# Patient Record
Sex: FEMALE | Race: WHITE | Employment: OTHER | URBAN - METROPOLITAN AREA
[De-identification: names, ages, dates, MRNs, and addresses within clinical notes are randomized per-mention and may not be internally consistent; named-entity substitution may affect disease eponyms.]

---

## 2022-02-02 ENCOUNTER — HOSPITAL ENCOUNTER (INPATIENT)
Age: 67
LOS: 3 days | Discharge: HOME HEALTH | DRG: 871 | End: 2022-02-05
Attending: EMERGENCY MEDICINE | Admitting: INTERNAL MEDICINE
Payer: MEDICARE

## 2022-02-02 ENCOUNTER — APPOINTMENT (OUTPATIENT)
Dept: GENERAL RADIOLOGY | Age: 67
DRG: 871 | End: 2022-02-02
Payer: MEDICARE

## 2022-02-02 DIAGNOSIS — J18.9 PNEUMONIA DUE TO INFECTIOUS ORGANISM, UNSPECIFIED LATERALITY, UNSPECIFIED PART OF LUNG: ICD-10-CM

## 2022-02-02 DIAGNOSIS — E83.52 HYPERCALCEMIA: Primary | ICD-10-CM

## 2022-02-02 PROBLEM — G62.9 POLYNEUROPATHY: Status: ACTIVE | Noted: 2019-12-17

## 2022-02-02 PROBLEM — I10 PRIMARY HYPERTENSION: Status: ACTIVE | Noted: 2021-07-28

## 2022-02-02 PROBLEM — E87.20 LACTIC ACID INCREASED: Status: ACTIVE | Noted: 2022-02-02

## 2022-02-02 PROBLEM — F03.90 DEMENTIA (HCC): Status: ACTIVE | Noted: 2022-02-02

## 2022-02-02 PROBLEM — N17.9 AKI (ACUTE KIDNEY INJURY) (HCC): Status: ACTIVE | Noted: 2022-02-02

## 2022-02-02 PROBLEM — E86.0 DEHYDRATION: Status: ACTIVE | Noted: 2022-02-02

## 2022-02-02 PROBLEM — G20 PARKINSONISM (HCC): Status: ACTIVE | Noted: 2021-05-24

## 2022-02-02 PROBLEM — J45.909 ASTHMA: Chronic | Status: ACTIVE | Noted: 2021-07-28

## 2022-02-02 PROBLEM — D50.9 IRON DEFICIENCY ANEMIA: Status: ACTIVE | Noted: 2019-10-17

## 2022-02-02 PROBLEM — E78.5 HYPERLIPIDEMIA: Status: ACTIVE | Noted: 2022-02-02

## 2022-02-02 PROBLEM — J45.909 ASTHMA: Status: ACTIVE | Noted: 2021-07-28

## 2022-02-02 PROBLEM — K21.9 GERD (GASTROESOPHAGEAL REFLUX DISEASE): Status: ACTIVE | Noted: 2021-07-28

## 2022-02-02 LAB
ABSOLUTE EOS #: 0.09 K/UL (ref 0–0.44)
ABSOLUTE IMMATURE GRANULOCYTE: 0.13 K/UL (ref 0–0.3)
ABSOLUTE LYMPH #: 1.19 K/UL (ref 1.1–3.7)
ABSOLUTE MONO #: 1.15 K/UL (ref 0.1–1.2)
ADENOVIRUS PCR: NOT DETECTED
ANION GAP SERPL CALCULATED.3IONS-SCNC: 14 MMOL/L (ref 9–17)
BASOPHILS # BLD: 0 % (ref 0–2)
BASOPHILS ABSOLUTE: 0.06 K/UL (ref 0–0.2)
BNP INTERPRETATION: ABNORMAL
BORDETELLA PARAPERTUSSIS: NOT DETECTED
BORDETELLA PERTUSSIS PCR: NOT DETECTED
BUN BLDV-MCNC: 15 MG/DL (ref 8–23)
BUN/CREAT BLD: 12 (ref 9–20)
C-REACTIVE PROTEIN: 12.3 MG/L (ref 0–5)
CALCIUM IONIZED: 1.66 MMOL/L (ref 1.13–1.33)
CALCIUM SERPL-MCNC: 12.2 MG/DL (ref 8.6–10.4)
CHLAMYDIA PNEUMONIAE BY PCR: NOT DETECTED
CHLORIDE BLD-SCNC: 97 MMOL/L (ref 98–107)
CO2: 23 MMOL/L (ref 20–31)
CORONAVIRUS 229E PCR: NOT DETECTED
CORONAVIRUS HKU1 PCR: NOT DETECTED
CORONAVIRUS NL63 PCR: NOT DETECTED
CORONAVIRUS OC43 PCR: NOT DETECTED
CREAT SERPL-MCNC: 1.23 MG/DL (ref 0.5–0.9)
DIFFERENTIAL TYPE: ABNORMAL
EOSINOPHILS RELATIVE PERCENT: 1 % (ref 1–4)
GFR AFRICAN AMERICAN: 53 ML/MIN
GFR NON-AFRICAN AMERICAN: 44 ML/MIN
GFR SERPL CREATININE-BSD FRML MDRD: ABNORMAL ML/MIN/{1.73_M2}
GFR SERPL CREATININE-BSD FRML MDRD: ABNORMAL ML/MIN/{1.73_M2}
GLUCOSE BLD-MCNC: 150 MG/DL (ref 70–99)
HCT VFR BLD CALC: 37.5 % (ref 36.3–47.1)
HEMOGLOBIN: 12.3 G/DL (ref 11.9–15.1)
HUMAN METAPNEUMOVIRUS PCR: NOT DETECTED
IMMATURE GRANULOCYTES: 1 %
INFLUENZA A BY PCR: NOT DETECTED
INFLUENZA A H1 (2009) PCR: NORMAL
INFLUENZA A H1 PCR: NORMAL
INFLUENZA A H3 PCR: NORMAL
INFLUENZA B BY PCR: NOT DETECTED
LACTIC ACID: 1.7 MMOL/L (ref 0.5–2.2)
LACTIC ACID: 2.4 MMOL/L (ref 0.5–2.2)
LYMPHOCYTES # BLD: 7 % (ref 24–43)
MCH RBC QN AUTO: 30 PG (ref 25.2–33.5)
MCHC RBC AUTO-ENTMCNC: 32.8 G/DL (ref 28.4–34.8)
MCV RBC AUTO: 91.5 FL (ref 82.6–102.9)
MONOCYTES # BLD: 7 % (ref 3–12)
MYCOPLASMA PNEUMONIAE PCR: NOT DETECTED
NRBC AUTOMATED: 0 PER 100 WBC
PARAINFLUENZA 1 PCR: NOT DETECTED
PARAINFLUENZA 2 PCR: NOT DETECTED
PARAINFLUENZA 3 PCR: NOT DETECTED
PARAINFLUENZA 4 PCR: NOT DETECTED
PDW BLD-RTO: 12.7 % (ref 11.8–14.4)
PLATELET # BLD: 316 K/UL (ref 138–453)
PLATELET ESTIMATE: ABNORMAL
PMV BLD AUTO: 9.8 FL (ref 8.1–13.5)
POTASSIUM SERPL-SCNC: 3.5 MMOL/L (ref 3.7–5.3)
PRO-BNP: 592 PG/ML
PROCALCITONIN: 0.09 NG/ML
RBC # BLD: 4.1 M/UL (ref 3.95–5.11)
RBC # BLD: ABNORMAL 10*6/UL
RESP SYNCYTIAL VIRUS PCR: NOT DETECTED
RHINO/ENTEROVIRUS PCR: NOT DETECTED
SARS-COV-2, PCR: NOT DETECTED
SARS-COV-2, RAPID: NOT DETECTED
SEG NEUTROPHILS: 84 % (ref 36–65)
SEGMENTED NEUTROPHILS ABSOLUTE COUNT: 14.99 K/UL (ref 1.5–8.1)
SODIUM BLD-SCNC: 134 MMOL/L (ref 135–144)
SPECIMEN DESCRIPTION: NORMAL
SPECIMEN DESCRIPTION: NORMAL
WBC # BLD: 17.6 K/UL (ref 3.5–11.3)
WBC # BLD: ABNORMAL 10*3/UL

## 2022-02-02 PROCEDURE — 86140 C-REACTIVE PROTEIN: CPT

## 2022-02-02 PROCEDURE — 6360000002 HC RX W HCPCS: Performed by: EMERGENCY MEDICINE

## 2022-02-02 PROCEDURE — 0202U NFCT DS 22 TRGT SARS-COV-2: CPT

## 2022-02-02 PROCEDURE — 83605 ASSAY OF LACTIC ACID: CPT

## 2022-02-02 PROCEDURE — 6370000000 HC RX 637 (ALT 250 FOR IP): Performed by: NURSE PRACTITIONER

## 2022-02-02 PROCEDURE — 36415 COLL VENOUS BLD VENIPUNCTURE: CPT

## 2022-02-02 PROCEDURE — 82330 ASSAY OF CALCIUM: CPT

## 2022-02-02 PROCEDURE — 84145 PROCALCITONIN (PCT): CPT

## 2022-02-02 PROCEDURE — 87635 SARS-COV-2 COVID-19 AMP PRB: CPT

## 2022-02-02 PROCEDURE — 71045 X-RAY EXAM CHEST 1 VIEW: CPT

## 2022-02-02 PROCEDURE — 83880 ASSAY OF NATRIURETIC PEPTIDE: CPT

## 2022-02-02 PROCEDURE — 96365 THER/PROPH/DIAG IV INF INIT: CPT

## 2022-02-02 PROCEDURE — 6360000002 HC RX W HCPCS: Performed by: NURSE PRACTITIONER

## 2022-02-02 PROCEDURE — 99223 1ST HOSP IP/OBS HIGH 75: CPT | Performed by: NURSE PRACTITIONER

## 2022-02-02 PROCEDURE — 99284 EMERGENCY DEPT VISIT MOD MDM: CPT

## 2022-02-02 PROCEDURE — 80048 BASIC METABOLIC PNL TOTAL CA: CPT

## 2022-02-02 PROCEDURE — 2580000003 HC RX 258: Performed by: EMERGENCY MEDICINE

## 2022-02-02 PROCEDURE — 2580000003 HC RX 258: Performed by: NURSE PRACTITIONER

## 2022-02-02 PROCEDURE — 93005 ELECTROCARDIOGRAM TRACING: CPT | Performed by: EMERGENCY MEDICINE

## 2022-02-02 PROCEDURE — 6370000000 HC RX 637 (ALT 250 FOR IP): Performed by: EMERGENCY MEDICINE

## 2022-02-02 PROCEDURE — 87040 BLOOD CULTURE FOR BACTERIA: CPT

## 2022-02-02 PROCEDURE — 1200000000 HC SEMI PRIVATE

## 2022-02-02 PROCEDURE — 85025 COMPLETE CBC W/AUTO DIFF WBC: CPT

## 2022-02-02 RX ORDER — SODIUM CHLORIDE 0.9 % (FLUSH) 0.9 %
5-40 SYRINGE (ML) INJECTION EVERY 12 HOURS SCHEDULED
Status: DISCONTINUED | OUTPATIENT
Start: 2022-02-02 | End: 2022-02-05 | Stop reason: HOSPADM

## 2022-02-02 RX ORDER — BENZONATATE 100 MG/1
100 CAPSULE ORAL ONCE
Status: COMPLETED | OUTPATIENT
Start: 2022-02-02 | End: 2022-02-02

## 2022-02-02 RX ORDER — IPRATROPIUM BROMIDE AND ALBUTEROL SULFATE 2.5; .5 MG/3ML; MG/3ML
1 SOLUTION RESPIRATORY (INHALATION) EVERY 4 HOURS PRN
Status: DISCONTINUED | OUTPATIENT
Start: 2022-02-02 | End: 2022-02-05 | Stop reason: HOSPADM

## 2022-02-02 RX ORDER — ONDANSETRON 2 MG/ML
4 INJECTION INTRAMUSCULAR; INTRAVENOUS EVERY 6 HOURS PRN
Status: DISCONTINUED | OUTPATIENT
Start: 2022-02-02 | End: 2022-02-05 | Stop reason: HOSPADM

## 2022-02-02 RX ORDER — NICOTINE POLACRILEX 4 MG
15 LOZENGE BUCCAL PRN
Status: DISCONTINUED | OUTPATIENT
Start: 2022-02-02 | End: 2022-02-05 | Stop reason: HOSPADM

## 2022-02-02 RX ORDER — GUAIFENESIN 600 MG/1
600 TABLET, EXTENDED RELEASE ORAL 2 TIMES DAILY
Status: DISCONTINUED | OUTPATIENT
Start: 2022-02-02 | End: 2022-02-05 | Stop reason: HOSPADM

## 2022-02-02 RX ORDER — HYDRALAZINE HYDROCHLORIDE 20 MG/ML
10 INJECTION INTRAMUSCULAR; INTRAVENOUS EVERY 6 HOURS PRN
Status: DISCONTINUED | OUTPATIENT
Start: 2022-02-02 | End: 2022-02-05 | Stop reason: HOSPADM

## 2022-02-02 RX ORDER — SODIUM CHLORIDE 9 MG/ML
INJECTION, SOLUTION INTRAVENOUS CONTINUOUS
Status: DISCONTINUED | OUTPATIENT
Start: 2022-02-02 | End: 2022-02-04

## 2022-02-02 RX ORDER — ONDANSETRON 4 MG/1
4 TABLET, ORALLY DISINTEGRATING ORAL EVERY 8 HOURS PRN
Status: DISCONTINUED | OUTPATIENT
Start: 2022-02-02 | End: 2022-02-05 | Stop reason: HOSPADM

## 2022-02-02 RX ORDER — HYDROCODONE POLISTIREX AND CHLORPHENIRAMINE POLISTIREX 10; 8 MG/5ML; MG/5ML
5 SUSPENSION, EXTENDED RELEASE ORAL EVERY 12 HOURS PRN
Status: DISCONTINUED | OUTPATIENT
Start: 2022-02-02 | End: 2022-02-05 | Stop reason: HOSPADM

## 2022-02-02 RX ORDER — SODIUM CHLORIDE 0.9 % (FLUSH) 0.9 %
10 SYRINGE (ML) INJECTION PRN
Status: DISCONTINUED | OUTPATIENT
Start: 2022-02-02 | End: 2022-02-05 | Stop reason: HOSPADM

## 2022-02-02 RX ORDER — SODIUM CHLORIDE 9 MG/ML
25 INJECTION, SOLUTION INTRAVENOUS PRN
Status: DISCONTINUED | OUTPATIENT
Start: 2022-02-02 | End: 2022-02-05 | Stop reason: HOSPADM

## 2022-02-02 RX ORDER — PANTOPRAZOLE SODIUM 40 MG/1
40 TABLET, DELAYED RELEASE ORAL
Status: DISCONTINUED | OUTPATIENT
Start: 2022-02-03 | End: 2022-02-05 | Stop reason: HOSPADM

## 2022-02-02 RX ORDER — IPRATROPIUM BROMIDE AND ALBUTEROL SULFATE 2.5; .5 MG/3ML; MG/3ML
1 SOLUTION RESPIRATORY (INHALATION)
Status: DISCONTINUED | OUTPATIENT
Start: 2022-02-02 | End: 2022-02-02

## 2022-02-02 RX ORDER — POTASSIUM CHLORIDE 7.45 MG/ML
10 INJECTION INTRAVENOUS PRN
Status: DISCONTINUED | OUTPATIENT
Start: 2022-02-02 | End: 2022-02-05 | Stop reason: HOSPADM

## 2022-02-02 RX ORDER — AZITHROMYCIN 250 MG/1
500 TABLET, FILM COATED ORAL EVERY 24 HOURS
Status: DISCONTINUED | OUTPATIENT
Start: 2022-02-03 | End: 2022-02-04

## 2022-02-02 RX ORDER — 0.9 % SODIUM CHLORIDE 0.9 %
500 INTRAVENOUS SOLUTION INTRAVENOUS ONCE
Status: COMPLETED | OUTPATIENT
Start: 2022-02-02 | End: 2022-02-02

## 2022-02-02 RX ORDER — DEXTROSE MONOHYDRATE 50 MG/ML
100 INJECTION, SOLUTION INTRAVENOUS PRN
Status: DISCONTINUED | OUTPATIENT
Start: 2022-02-02 | End: 2022-02-05 | Stop reason: HOSPADM

## 2022-02-02 RX ORDER — CEFTRIAXONE 1 G/1
1000 INJECTION, POWDER, FOR SOLUTION INTRAMUSCULAR; INTRAVENOUS ONCE
Status: DISCONTINUED | OUTPATIENT
Start: 2022-02-02 | End: 2022-02-02

## 2022-02-02 RX ORDER — DEXTROSE MONOHYDRATE 25 G/50ML
12.5 INJECTION, SOLUTION INTRAVENOUS PRN
Status: DISCONTINUED | OUTPATIENT
Start: 2022-02-02 | End: 2022-02-05 | Stop reason: HOSPADM

## 2022-02-02 RX ORDER — POTASSIUM CHLORIDE 20 MEQ/1
40 TABLET, EXTENDED RELEASE ORAL PRN
Status: DISCONTINUED | OUTPATIENT
Start: 2022-02-02 | End: 2022-02-05 | Stop reason: HOSPADM

## 2022-02-02 RX ORDER — MAGNESIUM SULFATE 1 G/100ML
1000 INJECTION INTRAVENOUS PRN
Status: DISCONTINUED | OUTPATIENT
Start: 2022-02-02 | End: 2022-02-05 | Stop reason: HOSPADM

## 2022-02-02 RX ADMIN — GUAIFENESIN 600 MG: 600 TABLET, EXTENDED RELEASE ORAL at 16:37

## 2022-02-02 RX ADMIN — CEFTRIAXONE SODIUM 1000 MG: 1 INJECTION, POWDER, FOR SOLUTION INTRAMUSCULAR; INTRAVENOUS at 14:08

## 2022-02-02 RX ADMIN — BENZONATATE 100 MG: 100 CAPSULE ORAL at 14:15

## 2022-02-02 RX ADMIN — ENOXAPARIN SODIUM 40 MG: 100 INJECTION SUBCUTANEOUS at 16:36

## 2022-02-02 RX ADMIN — Medication 5 ML: at 18:23

## 2022-02-02 RX ADMIN — PHENOL 1 SPRAY: 1.5 LIQUID ORAL at 16:37

## 2022-02-02 RX ADMIN — AZITHROMYCIN MONOHYDRATE 500 MG: 500 INJECTION, POWDER, LYOPHILIZED, FOR SOLUTION INTRAVENOUS at 15:16

## 2022-02-02 RX ADMIN — POTASSIUM CHLORIDE 40 MEQ: 20 TABLET, EXTENDED RELEASE ORAL at 16:37

## 2022-02-02 RX ADMIN — PHENOL 1 SPRAY: 1.5 LIQUID ORAL at 22:17

## 2022-02-02 RX ADMIN — SODIUM CHLORIDE: 9 INJECTION, SOLUTION INTRAVENOUS at 16:36

## 2022-02-02 RX ADMIN — SODIUM CHLORIDE 500 ML: 9 INJECTION, SOLUTION INTRAVENOUS at 20:01

## 2022-02-02 ASSESSMENT — ENCOUNTER SYMPTOMS
WHEEZING: 1
BLOOD IN STOOL: 0
COUGH: 1
ABDOMINAL PAIN: 0
CONSTIPATION: 0
EYE DISCHARGE: 0
STRIDOR: 0
NAUSEA: 1
VOMITING: 0
SHORTNESS OF BREATH: 1
EYE REDNESS: 0
FACIAL SWELLING: 0
SORE THROAT: 1
DIARRHEA: 0
COLOR CHANGE: 0

## 2022-02-02 ASSESSMENT — PAIN DESCRIPTION - PAIN TYPE: TYPE: ACUTE PAIN

## 2022-02-02 ASSESSMENT — PAIN SCALES - GENERAL
PAINLEVEL_OUTOF10: 3
PAINLEVEL_OUTOF10: 0

## 2022-02-02 NOTE — RT PROTOCOL NOTE
RT Inhaler-Nebulizer Bronchodilator Protocol Note    There is a bronchodilator order in the chart from a provider indicating to follow the RT Bronchodilator Protocol and there is an Initiate RT Inhaler-Nebulizer Bronchodilator Protocol order as well (see protocol at bottom of note). CXR Findings:  XR CHEST PORTABLE    Result Date: 2/2/2022  No prior study available for comparison. Mild basilar opacities, likely atelectatic; minimal infiltrate, particular at the right base, should be considered. No consolidation or sizable pleural effusion. The findings from the last RT Protocol Assessment were as follows:   History Pulmonary Disease: None or smoker <15 pack years  Respiratory Pattern: Regular pattern and RR 12-20 bpm  Breath Sounds: Slightly diminished and/or crackles  Cough: Strong, spontaneous, non-productive  Indication for Bronchodilator Therapy: Decreased or absent breath sounds  Bronchodilator Assessment Score: 2    Aerosolized bronchodilator medication orders have been revised according to the RT Inhaler-Nebulizer Bronchodilator Protocol below. Respiratory Therapist to perform RT Therapy Protocol Assessment initially then follow the protocol. Repeat RT Therapy Protocol Assessment PRN for score 0-3 or on second treatment, BID, and PRN for scores above 3. No Indications - adjust the frequency to every 6 hours PRN wheezing or bronchospasm, if no treatments needed after 48 hours then discontinue using Per Protocol order mode. If indication present, adjust the RT bronchodilator orders based on the Bronchodilator Assessment Score as indicated below. Use Inhaler orders unless patient has one or more of the following: on home nebulizer, not able to hold breath for 10 seconds, is not alert and oriented, cannot activate and use MDI correctly, or respiratory rate 25 breaths per minute or more, then use the equivalent nebulizer order(s) with same Frequency and PRN reasons based on the score.   If a patient is on this medication at home then do not decrease Frequency below that used at home. 0-3 - enter or revise RT bronchodilator order(s) to equivalent RT Bronchodilator order with Frequency of every 4 hours PRN for wheezing or increased work of breathing using Per Protocol order mode. 4-6 - enter or revise RT Bronchodilator order(s) to two equivalent RT bronchodilator orders with one order with BID Frequency and one order with Frequency of every 4 hours PRN wheezing or increased work of breathing using Per Protocol order mode. 7-10 - enter or revise RT Bronchodilator order(s) to two equivalent RT bronchodilator orders with one order with TID Frequency and one order with Frequency of every 4 hours PRN wheezing or increased work of breathing using Per Protocol order mode. 11-13 - enter or revise RT Bronchodilator order(s) to one equivalent RT bronchodilator order with QID Frequency and an Albuterol order with Frequency of every 4 hours PRN wheezing or increased work of breathing using Per Protocol order mode. Greater than 13 - enter or revise RT Bronchodilator order(s) to one equivalent RT bronchodilator order with every 4 hours Frequency and an Albuterol order with Frequency of every 2 hours PRN wheezing or increased work of breathing using Per Protocol order mode. RT to enter RT Home Evaluation for COPD & MDI Assessment order using Per Protocol order mode.     Electronically signed by Mallory Golden RCP on 2/2/2022 at 4:17 PM

## 2022-02-02 NOTE — ED NOTES
Pt to room by wheelchair. Pt c/o cough x2 weeks. Pt states she saw her pcp who ordered steroids and atbs. Pt states she finished her scripts and does not feel any better. Pt A&O x3, skin warm and dry, respirations equal and unlabored bilat.      Kennedi Kwon RN  02/02/22 [id number]
minimum assist (75% patients effort)/contact guard

## 2022-02-02 NOTE — ED PROVIDER NOTES
[facility] ED  EMERGENCY DEPARTMENT ENCOUNTER      Pt Name: Toyin Golden  MRN: [id number]  [place] 1955  Date of evaluation: 2/2/2022  Provider: Frankie Bueno MD    CHIEF COMPLAINT     No chief complaint on file. HISTORY OF PRESENT ILLNESS  (Location/Symptom, Timing/Onset, Context/Setting, Quality, Duration, Modifying Factors, Severity.)   Toyin Golden is a 78 y.o. female who presents to the emergency department for cough. She has had this for several days and it is nonproductive. She states that she has had her vaccination but not yet her booster for Covid. No fever. Her symptoms are continuous. She does not complain to me of any chest pain. Nursing Notes were reviewed. ALLERGIES     Patient has no allergy information on record. CURRENT MEDICATIONS       Previous Medications    No medications on file       PAST MEDICAL HISTORY   No past medical history on file. SURGICAL HISTORY     No past surgical history on file. FAMILY HISTORY     No family history on file. No family status information on file. SOCIAL HISTORY          REVIEW OF SYSTEMS    (2-9 systems for level 4, 10 or more for level 5)     Review of Systems   Constitutional: Negative for chills, fatigue and fever. HENT: Negative for congestion, ear discharge and facial swelling. Eyes: Negative for discharge and redness. Respiratory: Positive for cough and shortness of breath. Cardiovascular: Negative for chest pain. Gastrointestinal: Negative for abdominal pain, constipation, diarrhea and vomiting. Genitourinary: Negative for dysuria and hematuria. Musculoskeletal: Negative for arthralgias. Skin: Negative for color change and rash. Neurological: Negative for syncope, numbness and headaches. Hematological: Negative for adenopathy. Psychiatric/Behavioral: Negative for confusion. The patient is not nervous/anxious.          Except as noted above the remainder of the review of systems was reviewed and negative. PHYSICAL EXAM    (up to 7 for level 4, 8 or more for level 5)     Vitals:    02/02/22 1200 02/02/22 1202   BP: (!) 163/82    Pulse: 93 92   Resp: 24 24   SpO2: (!) 86% 95%       Physical Exam  Vitals reviewed. Constitutional:       General: She is not in acute distress. Appearance: She is well-developed. She is not diaphoretic. Comments: She is coughing quite frequently   HENT:      Head: Normocephalic and atraumatic. Eyes:      General: No scleral icterus. Right eye: No discharge. Left eye: No discharge. Cardiovascular:      Rate and Rhythm: Normal rate and regular rhythm. Pulmonary:      Effort: Pulmonary effort is normal. No respiratory distress. Breath sounds: Normal breath sounds. No stridor. No wheezing or rales. Abdominal:      General: There is no distension. Palpations: Abdomen is soft. Tenderness: There is no abdominal tenderness. Musculoskeletal:         General: Normal range of motion. Cervical back: Neck supple. Lymphadenopathy:      Cervical: No cervical adenopathy. Skin:     General: Skin is warm and dry. Findings: No erythema or rash. Neurological:      Mental Status: She is alert and oriented to person, place, and time. Psychiatric:         Behavior: Behavior normal.             DIAGNOSTIC RESULTS     EKG: All EKG's are interpreted by the Emergency Department Physician who either signs or Co-signs this chart in the absence of a cardiologist.    RADIOLOGY:   Non-plain film images such as CT, Ultrasound and MRI are read by the radiologist. Plain radiographic images are visualized and preliminarily interpreted by the emergency physician with the below findings:    Interpretation per the Radiologist below, if available at the time of this note:    XR CHEST PORTABLE    Result Date: 2/2/2022  EXAMINATION: ONE XRAY VIEW OF THE CHEST 2/2/2022 12:41 pm COMPARISON: None.  HISTORY: ORDERING SYSTEM PROVIDED HISTORY: Cough TECHNOLOGIST PROVIDED HISTORY: Cough Reason for Exam: cough x 2-3 weeks FINDINGS: Overlying ECG monitor leads, neck clips, tubing and snaps. Cardiac silhouette WNL for AP technique. Mediastinal structures midline with slight elongation and calcification aortic knob. Some hazy basilar opacity more at the right base, possibly atelectatic and superimposed breast tissue; minimal infiltrate, particularly at the right base, should be considered. No consolidation or sizable pleural effusion. Bones appear intact. No prior study available for comparison. Mild basilar opacities, likely atelectatic; minimal infiltrate, particular at the right base, should be considered. No consolidation or sizable pleural effusion. LABS:  Labs Reviewed - No data to display    All other labs were within normal range or not returned as of this dictation. EMERGENCY DEPARTMENT COURSE and DIFFERENTIAL DIAGNOSIS/MDM:   Vitals:    Vitals:    02/02/22 1200 02/02/22 1202   BP: (!) 163/82    Pulse: 93 92   Resp: 24 24   SpO2: (!) 86% 95%       No orders of the defined types were placed in this encounter. Medical Decision Making: Coronavirus test is negative and x-ray is consistent with pneumonia. She was 86 to 87% on room air and refused to put nasal cannula oxygen on. She asked for a mask instead. O2 sat came up. Blood cultures were obtained and she was given IV Rocephin and Zithromax and she is being admitted. Treatment diagnosis and disposition were discussed with the patient. CONSULTS:  None    PROCEDURES:  None    FINAL IMPRESSION    No diagnosis found. DISPOSITION/PLAN   DISPOSITION        PATIENT REFERRED TO:   No follow-up provider specified. DISCHARGE MEDICATIONS:     New Prescriptions    No medications on file       The care is provided during an unprecedented national emergency due to the novel coronavirus, COVID-19.     (Please note that portions of this note were completed with a voice recognition program.  Efforts were made to edit the dictations but occasionally words are mis-transcribed.)    Leno Cosme MD  Attending Emergency Physician           Leno Cosme MD  02/02/22 5404

## 2022-02-02 NOTE — H&P
[facility]  Office: [address], DO, Katie Polanco, DO, Kirsten Proctor, DO, Darion Bright, DO, Toby Hastings MD, Antoinette Johnson MD, Raffi Gacria MD, Jeremy Nagy MD, Olimpia Waterman MD, Beckie Marvin MD, Vanessa Cummings MD, Any Rogers, DO, Tramaine Rivera, DO, Hunter Graham MD,  Mattie Krishnamurthy DO, Nancy Joshi MD, Neymar Cruz MD, Braeden Ramsey MD, Williams Negron MD, Mer Agosto MD, [facility], [facility], [facility], CNP, Joanne Howard, CNP, Jennifer Lopez, CNS, Nancy Brooks, CNP, Jacquie Mendieta, CNP, Tapan Coleman, CNP, Campbell Holcomb, CNP, Caitlin Diamond, CNP, Zoë Delgado PA-C, Mike Madden, [facility], Merle West, [facility], Jose Hurd, CNP, Fernando Almeida, CNP, Ervin Eldridge, CNP, Elsa Bonilla, CNP, Roseanne Harris, CNP, Aida Glaser, [facility]    HISTORY AND PHYSICAL EXAMINATION            Date:   2/2/2022  Patient name:  Alfreda Lizama  Date of admission:  2/2/2022 11:57 AM  MRN:   [id number]  Account:  [de-identified]  YOB: 1955  PCP:    Leola Amaro MD  Room:   2022/2022-01  Code Status:    Full Code    Chief Complaint:     Chief Complaint   Patient presents with    Cough     Pt reports nonproductive cough xx 2 weeks        History Obtained From:     Patient and friend     History of Present Illness:     Alfreda Lizama is a 78 y.o. Non- / non  female who presents with Cough (Pt reports nonproductive cough xx 2 weeks )   and is admitted to the hospital for the management of Pneumonia of right lower lobe due to infectious organism. Patient presented to the ER with complaints of ongoing shortness of breath with consistent cough since January 21. Patient was seen by her PCP and was prescribed prednisone and doxycycline which she has completed with no relief of her symptoms.   She complains of decreased appetite, some congestion, sore throat, nausea and rib pain related to cough, and occasional wheezing. Her pertinent history includes dementia, GERD, hyperlipidemia, polyneuropathy, primary hypertension, and asthma. She is mildly hypertensive at 163/82, heart rates in the 90s respiratory rate in the mid 20s and she is afebrile. Initial pulse ox was 86% on room air. She is 97% on 2 L nasal cannula. Chest x-ray shows mild basilar opacity, likely atelectatic with minimal infiltrate particular on the right side. She appears slightly dehydrated with mild [place]. Her normal creatinine is approximately 0.95 today she is 1.23. Sodium 134, potassium 3.5 and chloride 97. Initial lactic one-point 7 repeat was 2.4. Plan to give a 500 mL bolus. Phocal 0.09. Continue antibiotics for now. She does present with leukocytosis with a white count of 17.6. Plan to check respiratory PCR/Covid and CRP. Rapid Covid was negative. Her initial serum calcium was 12.2 ionized calcium is 1.66. Plan to recheck prior to discharge. This may be reflective of dehydration. During my assessment the patient had difficulty answering my questions related to persistent coughing. Plan to start Tussionex and Mucinex. Past Medical History:     Past Medical History:   Diagnosis Date    Balance disorder     GERD (gastroesophageal reflux disease)     Hyperlipemia     Hypertension     Polyneuropathy         Past Surgical History:     Past Surgical History:   Procedure Laterality Date    CHOLECYSTECTOMY      HIP ARTHROPLASTY      3 on the left 4 on the right    LEG TENDON SURGERY      LUMBAR SPINE SURGERY      TONSILLECTOMY          Medications Prior to Admission:     Prior to Admission medications    Not on File        Allergies:     Nsaids and Tylenol [acetaminophen]    Social History:     Tobacco:    reports that she has never smoked. She has never used smokeless tobacco.  Alcohol:      reports no history of alcohol use. Drug Use:  reports previous drug use. Drug: Marijuana [place]).     Family History:     Family History   Problem Relation Age of Onset    Alcohol Abuse Mother     Abdominal aortic aneurysm Mother     Alcohol Abuse Father        Review of Systems:     Positive and Negative as described in HPI. Review of Systems   Constitutional: Positive for appetite change. Negative for chills, diaphoresis and fever. HENT: Positive for congestion and sore throat (from coughing). Negative for hearing loss. Respiratory: Positive for cough, shortness of breath and wheezing. Negative for stridor. Cardiovascular: Negative for chest pain, palpitations and leg swelling. Gastrointestinal: Positive for nausea (related to the cough). Negative for abdominal pain, blood in stool, constipation, diarrhea and vomiting. Genitourinary: Negative for dysuria and frequency. Musculoskeletal: Positive for myalgias (rib cage sore from coughing). Skin: Negative for rash. Neurological: Negative for dizziness, seizures and headaches. Psychiatric/Behavioral: The patient is not nervous/anxious. Physical Exam:   /68   Pulse 82   Temp 98.4 °F (36.9 °C) (Oral)   Resp 18   Ht 5' 1\" (1.549 m)   Wt 140 lb (63.5 kg)   SpO2 97%   BMI 26.45 kg/m²   Temp (24hrs), Av.1 °F (36.7 °C), Min:97.7 °F (36.5 °C), Max:98.4 °F (36.9 °C)    No results for input(s): POCGLU in the last 72 hours. No intake or output data in the 24 hours ending 22 1817    Physical Exam  Vitals and nursing note reviewed. HENT:      Mouth/Throat:      Mouth: Mucous membranes are dry. Eyes:      Extraocular Movements: Extraocular movements intact. Cardiovascular:      Rate and Rhythm: Normal rate and regular rhythm. Heart sounds: Normal heart sounds. Pulmonary:      Effort: Tachypnea present. Breath sounds: Examination of the right-lower field reveals decreased breath sounds. Examination of the left-lower field reveals decreased breath sounds. Decreased breath sounds present.       Comments: Consistent coughing during our conversation  Abdominal:      General: Bowel sounds are normal.      Palpations: Abdomen is soft. Musculoskeletal:      Right lower le+ Edema present. Left lower le+ Edema present. Skin:     General: Skin is warm. Capillary Refill: Capillary refill takes less than 2 seconds. Neurological:      Mental Status: She is oriented to person, place, and time. GCS: GCS eye subscore is 4. GCS verbal subscore is 5. GCS motor subscore is 6.    Psychiatric:         Attention and Perception: Attention normal.         Mood and Affect: Mood normal.         Investigations:      Laboratory Testing:  Recent Results (from the past 24 hour(s))   EKG 12 Lead    Collection Time: 22 12:10 PM   Result Value Ref Range    Ventricular Rate 81 BPM    Atrial Rate 81 BPM    P-R Interval 132 ms    QRS Duration 90 ms    Q-T Interval 392 ms    QTc Calculation (Bazett) 455 ms    P Axis -16 degrees    R Axis -20 degrees    T Axis 35 degrees   CBC Auto Differential    Collection Time: 22 12:20 PM   Result Value Ref Range    WBC 17.6 (H) 3.5 - 11.3 k/uL    RBC 4.10 3.95 - 5.11 m/uL    Hemoglobin 12.3 11.9 - 15.1 g/dL    Hematocrit 37.5 36.3 - 47.1 %    MCV 91.5 82.6 - 102.9 fL    MCH 30.0 25.2 - 33.5 pg    MCHC 32.8 28.4 - 34.8 g/dL    RDW 12.7 11.8 - 14.4 %    Platelets 162 761 - 815 k/uL    MPV 9.8 8.1 - 13.5 fL    NRBC Automated 0.0 0.0 per 100 WBC    Differential Type NOT REPORTED     Seg Neutrophils 84 (H) 36 - 65 %    Lymphocytes 7 (L) 24 - 43 %    Monocytes 7 3 - 12 %    Eosinophils % 1 1 - 4 %    Basophils 0 0 - 2 %    Immature Granulocytes 1 (H) 0 %    Segs Absolute 14.99 (H) 1.50 - 8.10 k/uL    Absolute Lymph # 1.19 1.10 - 3.70 k/uL    Absolute Mono # 1.15 0.10 - 1.20 k/uL    Absolute Eos # 0.09 0.00 - 0.44 k/uL    Basophils Absolute 0.06 0.00 - 0.20 k/uL    Absolute Immature Granulocyte 0.13 0.00 - 0.30 k/uL    WBC Morphology NOT REPORTED     RBC Morphology NOT REPORTED     Platelet atelectatic; minimal infiltrate, particular at the right base, should be considered. No consolidation or sizable pleural effusion. Assessment :      Hospital Problems           Last Modified POA    * (Principal) Pneumonia of right lower lobe due to infectious organism 2/2/2022 Yes    Dementia ([facility] [address] 2/2/2022 Yes    GERD (gastroesophageal reflux disease) 2/2/2022 Yes    Overview Signed 2/2/2022  2:00 PM by FLOYD Pettit CNP     Last Assessment & Plan:   Formatting of this note might be different from the original.  Assessment: managed with omeprazole         Hyperlipidemia 2/2/2022 Yes    Overview Signed 2/2/2022  2:00 PM by FLOYD Pettit CNP     Last Assessment & Plan:   Formatting of this note might be different from the original.  Lipid abnormalities are controlled with statin. Umesh Records Pharmacotherapy as ordered. Lipids will be reassessed in 1 year. Primary hypertension 2/2/2022 Yes    Overview Signed 2/2/2022  2:00 PM by FLOYD Pettit CNP     Last Assessment & Plan:   Formatting of this note might be different from the original.  Assessment: good control on lisinopril; follows with outside PCP - last visit 5/26/2021         Hypercalcemia 2/2/2022 Yes    Dehydration 2/2/2022 Yes    [place] (acute kidney injury) ([facility] [address] 2/2/2022 Yes          Plan:     Patient status inpatient in the  Med/Surge    Right lower lobe pneumonia; + leukocytosis. Procalcitonin 0.09. Plan to check respiratory PCR/Covid. Blood  x2 pending. Continue antibiotics for now. Tussionex and Mucinex. Continue nasal cannula oxygen and monitor for hypoxia    Increasing lactic acid; a 500 mL fluid bolus. Continue to monitor CBC/leukocytosis. Monitor for fever and chills. Blood cultures pending    History of primary hypertension. Patient's home medication reconciliation has not been completed per nursing yet. Use hydralazine as needed for systolic BP greater than 896.     History of GERD; start PPI pending home med rec reconciliation    [place] with dehydration; start normal saline. Monitor kidney function daily. Avoid nephrotoxic agents. Monitor I&O    Lovenox for DVT prophylaxis    Hypokalemia; replace per potassium sliding scale continue to monitor BMP daily. Hyperkalemia may be related to dehydration; ionized calcium 1.66. Repeat prior to discharge        Consultations:   Julian Giles HOSPITALIST     Patient is admitted as inpatient status because of co-morbidities listed above, severity of signs and symptoms as outlined, requirement for current medical therapies and most importantly because of direct risk to patient if care not provided in a hospital setting. Expected length of stay > 48 hours.     Adal Cadena, FLOYD - CNP  2/2/2022  6:17 PM    Copy sent to Dr. Jeri Dia MD

## 2022-02-02 NOTE — PROGRESS NOTES
Pt admitted to room 2022 from ED in stable condition.    Oriented to room and surroundings  Bed in lowest position, wheels locked, 2/4 side rails up  Call light in reach, room free of clutter, adequate lighting provided  Denies any further questions at this time  Instructed to call out with any questions/concerns/new onset of pain and/or n/v   White board updated  Continue to monitor with hourly rounding  STAY WITH ME protocol initiated   Bed alarm on/Fall Risk signs in place/Fall risk sticker to wrist band  Non-skid socks on/at bedside

## 2022-02-02 NOTE — FLOWSHEET NOTE
Patient is awake as she sits up in the bed while eating. Patient states that she is \"on the right track\" when writer inquires how she is doing. Patient states that she has family support but does not elaborate. Patient denies any spiritual or emotional concerns. Patient appears to be coping adequately. Spiritual Care will follow as needed.        02/02/22 4555   Encounter Summary   Services provided to: Patient   [facility]   Continue Visiting   (2/2/22)   Complexity of Encounter Low   Length of Encounter 15 minutes   Spiritual Assessment Completed Yes   Routine   Type Initial   Assessment Approachable   Intervention Active listening;Explored coping resources;Nurtured hope   Outcome Expressed gratitude

## 2022-02-02 NOTE — PLAN OF CARE
Problem: Infection:  Goal: Will remain free from infection  Description: Will remain free from infection  Outcome: Ongoing  Note: Ongoing     Problem: Safety:  Goal: Free from accidental physical injury  Description: Free from accidental physical injury  Outcome: Ongoing  Note: Ongoing     Problem: Daily Care:  Goal: Daily care needs are met  Description: Daily care needs are met  Outcome: Ongoing  Note: Ongoing     Problem: Skin Integrity:  Goal: Skin integrity will stabilize  Description: Skin integrity will stabilize  Outcome: Ongoing  Note: Ongoing     Problem: Breathing Pattern - Ineffective:  Goal: Ability to achieve and maintain a regular respiratory rate will improve  Description: Ability to achieve and maintain a regular respiratory rate will improve  Outcome: Ongoing  Note: Ongoing     Problem: Airway Clearance - Ineffective:  Goal: Ability to maintain a clear airway will improve  Description: Ability to maintain a clear airway will improve  Outcome: Ongoing  Note: Ongoing     Problem: Gas Exchange - Impaired:  Goal: Levels of oxygenation will improve  Description: Levels of oxygenation will improve  Outcome: Ongoing  Note: Ongoing

## 2022-02-03 ENCOUNTER — APPOINTMENT (OUTPATIENT)
Dept: CT IMAGING | Age: 67
DRG: 871 | End: 2022-02-03
Payer: MEDICARE

## 2022-02-03 ENCOUNTER — APPOINTMENT (OUTPATIENT)
Dept: GENERAL RADIOLOGY | Age: 67
DRG: 871 | End: 2022-02-03
Payer: MEDICARE

## 2022-02-03 LAB
AMPHETAMINE SCREEN URINE: NEGATIVE
ANION GAP SERPL CALCULATED.3IONS-SCNC: 11 MMOL/L (ref 9–17)
BARBITURATE SCREEN URINE: NEGATIVE
BENZODIAZEPINE SCREEN, URINE: NEGATIVE
BUN BLDV-MCNC: 14 MG/DL (ref 8–23)
BUN/CREAT BLD: 14 (ref 9–20)
BUPRENORPHINE URINE: ABNORMAL
CALCIUM IONIZED: 1.61 MMOL/L (ref 1.13–1.33)
CALCIUM SERPL-MCNC: 11 MG/DL (ref 8.6–10.4)
CANNABINOID SCREEN URINE: NEGATIVE
CHLORIDE BLD-SCNC: 106 MMOL/L (ref 98–107)
CO2: 21 MMOL/L (ref 20–31)
COCAINE METABOLITE, URINE: NEGATIVE
CREAT SERPL-MCNC: 0.99 MG/DL (ref 0.5–0.9)
D-DIMER QUANTITATIVE: 0.62 MG/L FEU (ref 0–0.59)
EKG ATRIAL RATE: 81 BPM
EKG P AXIS: -16 DEGREES
EKG P-R INTERVAL: 132 MS
EKG Q-T INTERVAL: 392 MS
EKG QRS DURATION: 90 MS
EKG QTC CALCULATION (BAZETT): 455 MS
EKG R AXIS: -20 DEGREES
EKG T AXIS: 35 DEGREES
EKG VENTRICULAR RATE: 81 BPM
GFR AFRICAN AMERICAN: >60 ML/MIN
GFR NON-AFRICAN AMERICAN: 56 ML/MIN
GFR SERPL CREATININE-BSD FRML MDRD: ABNORMAL ML/MIN/{1.73_M2}
GFR SERPL CREATININE-BSD FRML MDRD: ABNORMAL ML/MIN/{1.73_M2}
GLUCOSE BLD-MCNC: 106 MG/DL (ref 70–99)
HCT VFR BLD CALC: 35.2 % (ref 36.3–47.1)
HEMOGLOBIN: 11.3 G/DL (ref 11.9–15.1)
MCH RBC QN AUTO: 30.5 PG (ref 25.2–33.5)
MCHC RBC AUTO-ENTMCNC: 32.1 G/DL (ref 28.4–34.8)
MCV RBC AUTO: 95.1 FL (ref 82.6–102.9)
MDMA URINE: ABNORMAL
METHADONE SCREEN, URINE: NEGATIVE
METHAMPHETAMINE, URINE: ABNORMAL
NRBC AUTOMATED: 0 PER 100 WBC
OPIATES, URINE: POSITIVE
OXYCODONE SCREEN URINE: NEGATIVE
PDW BLD-RTO: 12.9 % (ref 11.8–14.4)
PHENCYCLIDINE, URINE: NEGATIVE
PLATELET # BLD: 301 K/UL (ref 138–453)
PMV BLD AUTO: 10.4 FL (ref 8.1–13.5)
POTASSIUM SERPL-SCNC: 4 MMOL/L (ref 3.7–5.3)
PROPOXYPHENE, URINE: ABNORMAL
RBC # BLD: 3.7 M/UL (ref 3.95–5.11)
SODIUM BLD-SCNC: 138 MMOL/L (ref 135–144)
TEST INFORMATION: ABNORMAL
TRICYCLIC ANTIDEPRESSANTS, UR: ABNORMAL
WBC # BLD: 16.5 K/UL (ref 3.5–11.3)

## 2022-02-03 PROCEDURE — 6360000004 HC RX CONTRAST MEDICATION: Performed by: INTERNAL MEDICINE

## 2022-02-03 PROCEDURE — 36415 COLL VENOUS BLD VENIPUNCTURE: CPT

## 2022-02-03 PROCEDURE — 82330 ASSAY OF CALCIUM: CPT

## 2022-02-03 PROCEDURE — 97530 THERAPEUTIC ACTIVITIES: CPT

## 2022-02-03 PROCEDURE — 97110 THERAPEUTIC EXERCISES: CPT

## 2022-02-03 PROCEDURE — 6370000000 HC RX 637 (ALT 250 FOR IP): Performed by: NURSE PRACTITIONER

## 2022-02-03 PROCEDURE — 1200000000 HC SEMI PRIVATE

## 2022-02-03 PROCEDURE — 99232 SBSQ HOSP IP/OBS MODERATE 35: CPT | Performed by: INTERNAL MEDICINE

## 2022-02-03 PROCEDURE — 71260 CT THORAX DX C+: CPT

## 2022-02-03 PROCEDURE — 85027 COMPLETE CBC AUTOMATED: CPT

## 2022-02-03 PROCEDURE — 2700000000 HC OXYGEN THERAPY PER DAY

## 2022-02-03 PROCEDURE — 71046 X-RAY EXAM CHEST 2 VIEWS: CPT

## 2022-02-03 PROCEDURE — 85379 FIBRIN DEGRADATION QUANT: CPT

## 2022-02-03 PROCEDURE — 2580000003 HC RX 258: Performed by: NURSE PRACTITIONER

## 2022-02-03 PROCEDURE — 97162 PT EVAL MOD COMPLEX 30 MIN: CPT

## 2022-02-03 PROCEDURE — 6360000002 HC RX W HCPCS: Performed by: NURSE PRACTITIONER

## 2022-02-03 PROCEDURE — 94761 N-INVAS EAR/PLS OXIMETRY MLT: CPT

## 2022-02-03 PROCEDURE — 80307 DRUG TEST PRSMV CHEM ANLYZR: CPT

## 2022-02-03 PROCEDURE — 93010 ELECTROCARDIOGRAM REPORT: CPT | Performed by: INTERNAL MEDICINE

## 2022-02-03 PROCEDURE — 6370000000 HC RX 637 (ALT 250 FOR IP): Performed by: INTERNAL MEDICINE

## 2022-02-03 PROCEDURE — 80048 BASIC METABOLIC PNL TOTAL CA: CPT

## 2022-02-03 PROCEDURE — 2580000003 HC RX 258: Performed by: INTERNAL MEDICINE

## 2022-02-03 RX ORDER — OMEPRAZOLE 20 MG/1
20 CAPSULE, DELAYED RELEASE ORAL
COMMUNITY

## 2022-02-03 RX ORDER — BUPROPION HYDROCHLORIDE 150 MG/1
150 TABLET ORAL EVERY MORNING
Status: DISCONTINUED | OUTPATIENT
Start: 2022-02-04 | End: 2022-02-05 | Stop reason: HOSPADM

## 2022-02-03 RX ORDER — MEMANTINE HYDROCHLORIDE 10 MG/1
10 TABLET ORAL 2 TIMES DAILY
Status: DISCONTINUED | OUTPATIENT
Start: 2022-02-03 | End: 2022-02-05 | Stop reason: HOSPADM

## 2022-02-03 RX ORDER — MEMANTINE HYDROCHLORIDE 10 MG/1
10 TABLET ORAL 2 TIMES DAILY
COMMUNITY

## 2022-02-03 RX ORDER — LAMOTRIGINE 100 MG/1
200 TABLET ORAL 2 TIMES DAILY
Status: DISCONTINUED | OUTPATIENT
Start: 2022-02-03 | End: 2022-02-05 | Stop reason: HOSPADM

## 2022-02-03 RX ORDER — FLUOXETINE HYDROCHLORIDE 20 MG/1
80 CAPSULE ORAL DAILY
Status: DISCONTINUED | OUTPATIENT
Start: 2022-02-03 | End: 2022-02-05 | Stop reason: HOSPADM

## 2022-02-03 RX ORDER — VITAMIN B COMPLEX
1000 TABLET ORAL DAILY
Status: DISCONTINUED | OUTPATIENT
Start: 2022-02-03 | End: 2022-02-04

## 2022-02-03 RX ORDER — ASCORBIC ACID 500 MG
500 TABLET ORAL DAILY
COMMUNITY

## 2022-02-03 RX ORDER — LAMOTRIGINE 200 MG/1
200 TABLET ORAL 2 TIMES DAILY
COMMUNITY

## 2022-02-03 RX ORDER — M-VIT,TX,IRON,MINS/CALC/FOLIC 27MG-0.4MG
1 TABLET ORAL DAILY
COMMUNITY

## 2022-02-03 RX ORDER — TRAMADOL HYDROCHLORIDE 50 MG/1
25-50 TABLET ORAL EVERY 12 HOURS PRN
Status: ON HOLD | COMMUNITY
End: 2022-02-05 | Stop reason: HOSPADM

## 2022-02-03 RX ORDER — 0.9 % SODIUM CHLORIDE 0.9 %
80 INTRAVENOUS SOLUTION INTRAVENOUS ONCE
Status: DISCONTINUED | OUTPATIENT
Start: 2022-02-03 | End: 2022-02-05 | Stop reason: HOSPADM

## 2022-02-03 RX ORDER — LANOLIN ALCOHOL/MO/W.PET/CERES
3-9 CREAM (GRAM) TOPICAL NIGHTLY PRN
COMMUNITY

## 2022-02-03 RX ORDER — SODIUM CHLORIDE 0.9 % (FLUSH) 0.9 %
10 SYRINGE (ML) INJECTION PRN
Status: DISCONTINUED | OUTPATIENT
Start: 2022-02-03 | End: 2022-02-05 | Stop reason: HOSPADM

## 2022-02-03 RX ORDER — LISINOPRIL 20 MG/1
20 TABLET ORAL DAILY
COMMUNITY

## 2022-02-03 RX ORDER — OXYCODONE HYDROCHLORIDE 5 MG/1
2.5 TABLET ORAL NIGHTLY
COMMUNITY

## 2022-02-03 RX ORDER — LISINOPRIL 20 MG/1
20 TABLET ORAL DAILY
Status: DISCONTINUED | OUTPATIENT
Start: 2022-02-03 | End: 2022-02-05 | Stop reason: HOSPADM

## 2022-02-03 RX ORDER — BUPROPION HYDROCHLORIDE 150 MG/1
150 TABLET ORAL EVERY MORNING
COMMUNITY

## 2022-02-03 RX ORDER — FLUOXETINE HYDROCHLORIDE 40 MG/1
80 CAPSULE ORAL DAILY
COMMUNITY

## 2022-02-03 RX ADMIN — PHENOL 1 SPRAY: 1.5 LIQUID ORAL at 02:57

## 2022-02-03 RX ADMIN — MEMANTINE 10 MG: 10 TABLET ORAL at 22:54

## 2022-02-03 RX ADMIN — CEFTRIAXONE SODIUM 1000 MG: 1 INJECTION, POWDER, FOR SOLUTION INTRAMUSCULAR; INTRAVENOUS at 14:03

## 2022-02-03 RX ADMIN — GUAIFENESIN 600 MG: 600 TABLET, EXTENDED RELEASE ORAL at 22:54

## 2022-02-03 RX ADMIN — Medication 80 ML: at 15:19

## 2022-02-03 RX ADMIN — AZITHROMYCIN MONOHYDRATE 500 MG: 250 TABLET ORAL at 13:58

## 2022-02-03 RX ADMIN — Medication 5 ML: at 23:17

## 2022-02-03 RX ADMIN — FLUOXETINE 80 MG: 20 CAPSULE ORAL at 17:46

## 2022-02-03 RX ADMIN — PANTOPRAZOLE SODIUM 40 MG: 40 TABLET, DELAYED RELEASE ORAL at 06:06

## 2022-02-03 RX ADMIN — SODIUM CHLORIDE, PRESERVATIVE FREE 10 ML: 5 INJECTION INTRAVENOUS at 15:19

## 2022-02-03 RX ADMIN — IOPAMIDOL 75 ML: 755 INJECTION, SOLUTION INTRAVENOUS at 15:14

## 2022-02-03 RX ADMIN — GUAIFENESIN 600 MG: 600 TABLET, EXTENDED RELEASE ORAL at 10:14

## 2022-02-03 RX ADMIN — Medication 1000 UNITS: at 17:47

## 2022-02-03 RX ADMIN — LAMOTRIGINE 200 MG: 100 TABLET ORAL at 23:11

## 2022-02-03 RX ADMIN — Medication 5 ML: at 10:14

## 2022-02-03 RX ADMIN — SODIUM CHLORIDE: 9 INJECTION, SOLUTION INTRAVENOUS at 02:52

## 2022-02-03 RX ADMIN — LISINOPRIL 20 MG: 20 TABLET ORAL at 17:47

## 2022-02-03 RX ADMIN — PHENOL 1 SPRAY: 1.5 LIQUID ORAL at 10:21

## 2022-02-03 RX ADMIN — SODIUM CHLORIDE, PRESERVATIVE FREE 10 ML: 5 INJECTION INTRAVENOUS at 23:11

## 2022-02-03 RX ADMIN — ENOXAPARIN SODIUM 40 MG: 100 INJECTION SUBCUTANEOUS at 10:14

## 2022-02-03 ASSESSMENT — PAIN SCALES - GENERAL
PAINLEVEL_OUTOF10: 0

## 2022-02-03 NOTE — PROGRESS NOTES
Physical Therapy    Facility/Department: [facility]  Initial Assessment    NAME: Oleg Sharma  : 1955  MRN: [id number]    Date of Service: 2/3/2022   NAVYA Goldberg reports patient is medically stable for therapy treatment this date. Chart reviewed prior to treatment and patient is agreeable for therapy. All lines intact and patient positioned comfortably at end of treatment. All patient needs addressed prior to ending therapy session. Per H&P: Oleg Sharma is a 78 y.o. Non- / non  female who presents with Cough (Pt reports nonproductive cough xx 2 weeks ) and is admitted to the hospital for the management of Pneumonia of right lower lobe due to infectious organism. Patient presented to the ER with complaints of ongoing shortness of breath with consistent cough since . Patient was seen by her PCP and was prescribed prednisone and doxycycline which she has completed with no relief of her symptoms. She complains of decreased appetite, some congestion, sore throat, nausea and rib pain related to cough, and occasional wheezing. Her pertinent history includes dementia, GERD, hyperlipidemia, polyneuropathy, primary hypertension, and asthma. Discharge Recommendations:  Due to recent hospitalization and medical condition, pt would benefit from additional therapy at time of discharge to ensure safety. Please refer to the AM-PAC score for current functional status. Patient would benefit from continued therapy after discharge      Assessment   Body structures, Functions, Activity limitations: Decreased strength;Decreased safe awareness;Decreased balance;Decreased functional mobility ; Decreased endurance  Assessment: Pt tolerated PT eval fair. Pt demonstrating fair steadiness throughout ambulation for short distances w/o AD this date. Pt most limited by endurance and respiratory status.   Pt would benefit from continued skilled physical therapy to address these deficits in order to return to PLOF. Prognosis: Excellent  Decision Making: Medium Complexity  PT Education: Goals;PT Role;Plan of Care;General Safety; Energy Conservation  Patient Education: Pt educated on: purpose of acute PT eval, importance of continued mobility throughout admission, prevention of sedentary complications, pursed lip breathing, general safety awareness, and PT POC. Pt verbalized fair understanding. Pt would benefit from continued reinforcement of education. REQUIRES PT FOLLOW UP: Yes  Activity Tolerance  Activity Tolerance: Patient limited by endurance  Activity Tolerance: Pt requiring frequent rest breaks throughout session d/t coughing. Pt's SpO2 dropped to 88% w/ activity but recovered w/ pursed lip breathing to low 90s. Patient Diagnosis(es): The encounter diagnosis was Pneumonia due to infectious organism, unspecified laterality, unspecified part of lung.     has a past medical history of Balance disorder, GERD (gastroesophageal reflux disease), Hyperlipemia, Hypertension, and Polyneuropathy. has a past surgical history that includes Hip Arthroplasty; Leg Tendon Surgery; Tonsillectomy; Cholecystectomy; and Lumbar spine surgery. Restrictions  Restrictions/Precautions  Restrictions/Precautions: General Precautions,Fall Risk,Up as Tolerated  Required Braces or Orthoses?: No  Position Activity Restriction  Other position/activity restrictions: 5L O2 NC, LUE IV, pulse ox  Vision/Hearing  Vision: Impaired  Vision Exceptions: Wears glasses at all times  Hearing: Within functional limits     Subjective  General  Patient assessed for rehabilitation services?: Yes  Response To Previous Treatment: Not applicable  Family / Caregiver Present: No  Follows Commands: Within Functional Limits  General Comment  Comments: RN and pt agreeable to therapy. Pt supine in bed upon arrival.  Pt pleasant and cooperative throughout.   Subjective  Subjective: Pt reporting that her breathing has been better since admission but reports cough is still present. Orientation  Orientation  Overall Orientation Status: Within Functional Limits  Social/Functional History  Social/Functional History  Lives With: Spouse (reports wife is supportive, good health able to assist PRN)  Type of Home: Apartment (4th floor)  Home Layout: One level  Home Access: Elevator,Level entry  Bathroom Shower/Tub: Tub/Shower unit  Bathroom Toilet: Handicap height  Bathroom Equipment: Shower chair,Grab bars around toilet  Home Equipment: 4 wheeled walker,Cane (uses 4WW inside home PRN and in community at all times)  ADL Assistance: Needs assistance (pt reports needing assist getting in/out of shower, states she does not need assist getting washed up)  Homemaking Assistance: Needs assistance  Homemaking Responsibilities: No (reports wife is doing cooking, cleaning, laundry)  Ambulation Assistance: Independent (w/ 7GI)  Transfer Assistance: Needs assistance  Active : No  Patient's  Info: wife drives  Occupation: Retired  Type of occupation: school counselor  Leisure & Hobbies: read, watch tv  Additional Comments: Hx of multiple TONY BLE w/ revisions and other repairs. Denies any recent falls, last fall was sometime before August 2021.   Cognition   Cognition  Overall Cognitive Status: WFL  Safety Judgement: Decreased awareness of need for assistance;Decreased awareness of need for safety    Objective     Observation/Palpation  Posture: Fair    AROM RLE (degrees)  RLE AROM: WFL  AROM LLE (degrees)  LLE AROM : WFL  AROM RUE (degrees)  RUE AROM : WFL  AROM LUE (degrees)  LUE AROM : WFL  Strength RLE  Strength RLE: Exception  Comment: Grossly 3+/5  Strength LLE  Strength LLE: Exception  Comment: Grossly 3+/5  Strength RUE  Strength RUE: WFL  Comment: Grossly 4-/5  Strength LUE  Strength LUE: WFL  Comment: Grossly 4-/5  Tone RLE  RLE Tone: Normotonic  Tone LLE  LLE Tone: Normotonic  Motor Control  Gross Motor?: WFL  Sensation  Overall Sensation Status: Impaired (reports chronic numbness/tingling in B feet)  Bed mobility  Supine to Sit: Stand by assistance  Sit to Supine: Stand by assistance  Scooting: Stand by assistance  Comment: Pt w/o significant difficulty throughout bed mobility this date requiring mod verbal cueing for use of bedrails throughout w/ good return demo. Pt also requiring mod verbal cueing for pursed lip breathing throughout w/ poor return demo. Assist required for safe line mgmt throughout. Transfers  Sit to Stand: Contact guard assistance  Stand to sit: Minimal Assistance (to control descent)  Comment: Pt w/ fair steadiness throughout transfers this date. Pt demonstrating poor eccentric quad control throughout stand>sit transfers this date requiring June to control descent. Ambulation  Ambulation?: Yes  More Ambulation?: Yes  Ambulation 1  Surface: level tile  Device: Rollator  Other Apparatus: O2  Assistance: Contact guard assistance  Quality of Gait: fair steadiness, assist w/ line mgmt  Gait Deviations: Slow Renate;Decreased step length;Decreased step height  Distance: 5ft x 2  Comments: Pt ambulating w/ fair steadiness w/ rollator this date requiring max verbal cueing for safety w/ line mgmt throughout. Ambulation 2  Surface - 2: level tile  Device 2: No device  Other Apparatus 2: O2  Assistance 2: Minimal assistance; Moderate assistance  Quality of Gait 2: fair steadiness, mild lateral trunk sway  Gait Deviations: Slow Renate; Shuffles;Decreased step length;Decreased step height  Distance: 5ft  Comments: Pt ambulating w/ fair steadiness w/o AD a short distance, occasionally reaching for furniture and nearby objects to steady herself throughout. Pt also requiring max assist for safe line mgmt throughout.   Stairs/Curb  Stairs?: No     Balance  Posture: Fair  Sitting - Static: Good  Sitting - Dynamic: Good  Standing - Static: +;Fair  Standing - Dynamic: Fair;-  Single Leg Stance R Le  Single Leg Stance L Le  Comments: Standing balance assessed w/o AD  Romberg/Narrow Base of Support  Eyes Open: 10 seconds  Sway: None  Eyes Closed: 0 seconds        Plan   Plan  Times per week: 1-2x/day, 5-6x/week  Current Treatment Recommendations: Strengthening,Balance Training,Functional Mobility Training,Stair training,Gait Training,Transfer Training,Endurance Training,Safety Education & Training,Home Exercise Program,Equipment Evaluation, Education, & procurement,Patient/Caregiver Education & Training  Safety Devices  Type of devices: Bed alarm in place,Call light within reach,Gait belt,Nurse notified,Left in bed  Restraints  Initially in place: No    AM-PAC Score  AM-PAC Inpatient Mobility Raw Score : 19 (02/03/22 1115)  AM-PAC Inpatient T-Scale Score : 45.44 (02/03/22 1115)  Mobility Inpatient CMS 0-100% Score: 41.77 (02/03/22 1115)  Mobility Inpatient CMS G-Code Modifier : CK (02/03/22 1115)       Functional Outcome Measure-   Single Leg Stance Test:  1 sec. (<5 sec.= fall risk)    Goals  Short term goals  Time Frame for Short term goals: 10 visits  Short term goal 1: Pt to demonstrate bed mobility and transfers w/o AD independently  Short term goal 2: Pt to ambulate at least 50ft w/o AD Joseline  Short term goal 3: Pt to verbalize energy conservation techniques and fall risk prevention strategies  Short term goal 4: Pt to actively participate in at least 30 minutes of physical therapy for ther act, ther ex, balance, gait, and endurance training  Patient Goals   Patient goals : Less coughing, to go home       Therapy Time   Individual Concurrent Group Co-treatment   Time In 0916         Time Out 0952         Minutes 36+10 = 46 total            Additional 10 minutes added for chart review and RN communication     Treatment time: 30 minutes     Camron Tai PT      Writer returned from [id number] in order to provide pt w/ supine & seated HEP. Writer provided written and verbal instructions.     Treatment time: 9 minutes     Camron Tai PT

## 2022-02-03 NOTE — PROGRESS NOTES
Transitions of Care Pharmacy Service   Medication Review    The patient's list of current home medications has been reviewed and updated. Source(s) of information: Patient/ [facility] [employer]    Please feel free to call with any questions about this encounter. Thank you. Nimesh Schultz [facility]  Transitions of Care Pharmacy Service  Phone:  [phone]  Fax: [phone]          Prior to Admission medications    Medication Sig Start Date End Date Taking? Authorizing Provider   buPROPion (WELLBUTRIN XL) 150 MG extended release tablet Take 150 mg by mouth every morning   Yes Historical Provider, MD   Multiple Vitamins-Minerals (THERAPEUTIC MULTIVITAMIN-MINERALS) tablet Take 1 tablet by mouth daily   Yes Historical Provider, MD   psyllium (METAMUCIL) 58.6 % packet Take 1 packet by mouth daily   Yes Historical Provider, MD   vitamin D (CHOLECALCIFEROL) 25 MCG (1000 UT) TABS tablet Take 1,000 Units by mouth daily   Yes Historical Provider, MD   vitamin C (ASCORBIC ACID) 500 MG tablet Take 500 mg by mouth daily   Yes Historical Provider, MD   NONFORMULARY Take 1 capsule by mouth daily [place] [address]   Yes Historical Provider, MD   memantine (NAMENDA) 10 MG tablet Take 10 mg by mouth 2 times daily   Yes Historical Provider, MD   melatonin 3 MG TABS tablet Take 3-9 mg by mouth nightly as needed   Yes Historical Provider, MD   omeprazole (PRILOSEC) 20 MG delayed release capsule Take 20 mg by mouth every morning (before breakfast)   Yes Historical Provider, MD   lamoTRIgine (LAMICTAL) 200 MG tablet Take 200 mg by mouth 2 times daily   Yes Historical Provider, MD   traMADol (ULTRAM) 50 MG tablet Take 25-50 mg by mouth every 12 hours as needed for Pain.    Yes Historical Provider, MD   FLUoxetine (PROZAC) 40 MG capsule Take 80 mg by mouth daily   Yes Historical Provider, MD   lisinopril (PRINIVIL;ZESTRIL) 20 MG tablet Take 20 mg by mouth daily   Yes Historical Provider, MD   oxyCODONE (ROXICODONE) 5 MG immediate release tablet Take 2.5 mg by mouth at bedtime.    Yes Historical Provider, MD

## 2022-02-03 NOTE — DISCHARGE INSTR - COC
Continuity of Care Form    Patient Name: Yojana Marquez   :  1955  MRN:  [id number]    Admit date:  2022  Discharge date:  2022    Code Status Order: Full Code   Advance Directives:      Admitting Physician:  Krysta Huffman MD  PCP: Durga Sweeney MD    Discharging Nurse: Jasper Khan Unit/Room#:   Discharging Unit Phone Number: [phone]    Emergency Contact:   Extended Emergency Contact Information  Primary Emergency Contact: Jerrod Bell, [address]  Phone: [phone]  Relation: Spouse    Past Surgical History:  Past Surgical History:   Procedure Laterality Date    CHOLECYSTECTOMY      HIP ARTHROPLASTY      3 on the left 4 on the right    LEG TENDON SURGERY      LUMBAR SPINE SURGERY      TONSILLECTOMY         Immunization History:   Immunization History   Administered Date(s) Administered    COVID-19, Pfizer Purple top, DILUTE for use, 12+ yrs, 30mcg/0.3mL dose 2021, 2021, 2021    Influenza, Andrew Hum, Recombinant, IM PF (Flublok 18 yrs and older) 10/30/2020       Active Problems:  Patient Active Problem List   Diagnosis Code    Asthma J45.909    Dementia ([facility] [address] F03.90    GERD (gastroesophageal reflux disease) K21.9    Hyperlipidemia E78.5    Primary hypertension I10    Parkinsonism ([facility] [address] G20    Iron deficiency anemia D50.9    Polyneuropathy G62.9    Pneumonia of right lower lobe due to infectious organism J18.9    Hypercalcemia E83.52    Dehydration E86.0    [place] (acute kidney injury) ([facility] [address] N17.9    Lactic acid increased E87.2       Isolation/Infection:   Isolation            No Isolation          Patient Infection Status       Infection Onset Added Last Indicated Last Indicated By Review Planned Expiration Resolved Resolved By    None active    Resolved    COVID-19 (Rule Out) 22 Respiratory Panel, Molecular, with COVID-19 (Restricted: peds pts or suitable admitted adults) (Ordered)   22 Rule-Out Test Resulted COVID-19 (Rule Out) 02/02/22 02/02/22 02/02/22 COVID-19, Rapid (Ordered)   02/02/22 Rule-Out Test Resulted            Nurse Assessment:  Last Vital Signs: BP (!) 127/55   Pulse 75   Temp 98.3 °F (36.8 °C) (Oral)   Resp 18   Ht 5' 1\" (1.549 m)   Wt 140 lb 6 oz (63.7 kg)   SpO2 96%   BMI 26.52 kg/m²     Last documented pain score (0-10 scale): Pain Level: 0  Last Weight:   Wt Readings from Last 1 Encounters:   02/03/22 140 lb 6 oz (63.7 kg)     Mental Status:  oriented and alert    IV Access:  - None    Nursing Mobility/ADLs:  Walking   Assisted  Transfer  Assisted  Bathing  Assisted  Dressing  Assisted  Toileting  Assisted  Feeding  Independent  Med Admin  Independent  Med Delivery   whole    Wound Care Documentation and Therapy:        Elimination:  Continence: Bowel: Yes  Bladder: Yes  Urinary Catheter: None   Colostomy/Ileostomy/Ileal Conduit: No       Date of Last BM: 2/4/2022    Intake/Output Summary (Last 24 hours) at 2/3/2022 1157  Last data filed at 2/3/2022 1046  Gross per 24 hour   Intake 609.46 ml   Output 600 ml   Net 9.46 ml     I/O last 3 completed shifts: In: 609.5 [P.O.:120; I.V.:443.3; IV Piggyback:46.2]  Out: 200 [Urine:200]    Safety Concerns: At Risk for Falls    Impairments/Disabilities:      None    Nutrition Therapy:  Current Nutrition Therapy:   - Oral Diet:  General and Carb Control 4 carbs/meal (1800kcals/day)    Routes of Feeding: Oral  Liquids: No Restrictions  Daily Fluid Restriction: no  Last Modified Barium Swallow with Video (Video Swallowing Test): not done    Treatments at the Time of Hospital Discharge:   Respiratory Treatments:   Oxygen Therapy:  is on oxygen at 2 L/min per nasal cannula.   Ventilator:    - No ventilator support    Rehab Therapies: Physical Therapy and Occupational Therapy  Weight Bearing Status/Restrictions: No weight bearing restirctions  Other Medical Equipment (for information only, NOT a DME order):  walker  Other Treatments: Skilled nursing assessment  Med teaching and compliance    Patient's personal belongings (please select all that are sent with patient):  Glasses    RN SIGNATURE:  Electronically signed by Fran Salomon RN on 2/5/22 at 2:34 PM EST    CASE MANAGEMENT/SOCIAL WORK SECTION    Inpatient Status Date: ***    Readmission Risk Assessment Score:  Readmission Risk              Risk of Unplanned Readmission:  14           Discharging to Facility/ Agency   Name: [facility]  Address:  Phone: [phone]  Fax: [phone]      / signature: Electronically signed by Luis Bagley RN on 2/3/22 at 11:57 AM EST    PHYSICIAN SECTION    Prognosis: Good    Condition at Discharge: Stable    Rehab Potential (if transferring to Rehab): Good    Recommended Labs or Other Treatments After Discharge: Use oxygen at home as directed, recheck BMP in 1 week and follow-up with PCP with results for BMP as well as intact PTH, PT OT, monitor vitals at home    Physician Certification: I certify the above information and transfer of Diana Delgado  is necessary for the continuing treatment of the diagnosis listed and that she requires [address] for greater 30 days.      Update Admission H&P: No change in H&P    PHYSICIAN SIGNATURE:  Electronically signed by Layla Wu MD on 2/5/22 at 12:36 PM EST

## 2022-02-03 NOTE — PROGRESS NOTES
[facility]  Office: [address], DO, Loulou Salinas, DO, Jacoby Domingo, DO, Zarina Bright, DO, Solis Ellison MD, Deangelo Gagnon MD, Doug Farias MD, Matteo Elise MD, Keshawn Vasquez MD, Carolynn Snellen, MD, Anton Newman MD, Diego Simons, DO, Libby Montes, DO, Reina Moffett MD,  Cy Berry DO, Jm Noel MD, Osmar Mosquera MD, Isabella Carlos MD, Rustam Hou MD, Sunny Khan MD, Celsa Mccormack [facility], [facility] Ru[facility], CNP, Carmine Jack, CNP, Benny Hill, CNS, Odalis Stoll, CNP, Kiel Cervantes, CNP, Mekhi Keys, CNP, Governor Floyder, CNP, Job Matthews, CNP, Lois Eid PA-C, Jeanmarie Leger [facility], Bam Mendes DNP, Doreen Chavez, CNP, Harry Montanez CNP, Karen Oseguera CNP, Larisa Lara, CNP, Antonieta Pearl, CNP, Santosh Clark, Lucio [facility]    Progress Note    2/3/2022    2:01 PM    Name:   Feng Ohara  MRN:     [id number]     [place]:      [de-identified]   Room:   2022/2022-01   Day:  1  Admit Date:  2/2/2022 11:57 AM    PCP:   Maria Isabel Mao MD  Code Status:  Full Code    Subjective:     C/C:   Chief Complaint   Patient presents with   Donis Cough     Pt reports nonproductive cough xx 2 weeks      Interval History Status: .   Still having cough with difficulty expectoration  Saturating 90% on 4 L oxygen  Slightly confused, states she has developed dementia  Denies smoking but has been smoking weed before  Denies pain or fever at present    Brief History:   Feng Ohara is a 78 y.o. Non- / non  female who presents with Cough (Pt reports nonproductive cough xx 2 weeks )   and is admitted to the hospital for the management of Pneumonia of right lower lobe due to infectious organism.     Patient presented to the ER with complaints of ongoing shortness of breath with consistent cough since January 21.   Patient was seen by her PCP and was prescribed prednisone and doxycycline which she has completed with no relief of her symptoms. She complains of decreased appetite, some congestion, sore throat, nausea and rib pain related to cough, and occasional wheezing. Her pertinent history includes dementia, GERD, hyperlipidemia, polyneuropathy, primary hypertension, and asthma.      She is mildly hypertensive at 163/82, heart rates in the 90s respiratory rate in the mid 20s and she is afebrile. Initial pulse ox was 86% on room air. She is 97% on 2 L nasal cannula. Chest x-ray shows mild basilar opacity, likely atelectatic with minimal infiltrate particular on the right side. She appears slightly dehydrated with mild [place]. Her normal creatinine is approximately 0.95 today she is 1.23. Sodium 134, potassium 3.5 and chloride 97. Initial lactic one-point 7 repeat was 2.4. Plan to give a 500 mL bolus. Phocal 0.09. Continue antibiotics for now. She does present with leukocytosis with a white count of 17.6. Plan to check respiratory PCR/Covid and CRP. Rapid Covid was negative. Her initial serum calcium was 12.2 ionized calcium is 1.66. Plan to recheck prior to discharge. This may be reflective of dehydration.      During my assessment the patient had difficulty answering my questions related to persistent coughing. Plan to start Tussionex and Mucinex      Review of Systems:     Constitutional:  negative for chills, fevers, sweats  Respiratory:  + for cough with difficulty expectoration, +dyspnea on exertion,   Cardiovascular:  negative for chest pain, chest pressure/discomfort, lower extremity edema, palpitations  Gastrointestinal:  negative for abdominal pain, constipation, diarrhea, nausea, vomiting  Neurological:  negative for dizziness, headache    Medications: Allergies:     Allergies   Allergen Reactions    Nsaids     Tylenol [Acetaminophen]        Current Meds:   Scheduled Meds:    sodium chloride flush  5-40 mL IntraVENous 2 times per day    enoxaparin  40 mg SubCUTAneous Daily    cefTRIAXone (ROCEPHIN) IV  1,000 mg IntraVENous Q24H    And    azithromycin  500 mg Oral Q24H    guaiFENesin  600 mg Oral BID    pantoprazole  40 mg Oral QAM AC     Continuous Infusions:    sodium chloride 75 mL/hr at 22 0607    sodium chloride      dextrose       PRN Meds: sodium chloride flush, sodium chloride, ondansetron **OR** ondansetron, magnesium hydroxide, magnesium sulfate, potassium chloride **OR** potassium alternative oral replacement **OR** potassium chloride, glucose, dextrose, glucagon (rDNA), dextrose, phenol, HYDROcodone-chlorpheniramine, ipratropium-albuterol, hydrALAZINE    Data:     Past Medical History:   has a past medical history of Balance disorder, GERD (gastroesophageal reflux disease), Hyperlipemia, Hypertension, and Polyneuropathy. Social History:   reports that she has never smoked. She has never used smokeless tobacco. She reports previous drug use. Drug: Marijuana P[place] Mi). She reports that she does not drink alcohol. Family History:   Family History   Problem Relation Age of Onset    Alcohol Abuse Mother     Abdominal aortic aneurysm Mother     Alcohol Abuse Father        Vitals:  BP (!) 127/55   Pulse 75   Temp 98.3 °F (36.8 °C) (Oral)   Resp 18   Ht 5' 1\" (1.549 m)   Wt 140 lb 6 oz (63.7 kg)   SpO2 96%   BMI 26.52 kg/m²   Temp (24hrs), Av.1 °F (36.7 °C), Min:97.7 °F (36.5 °C), Max:98.4 °F (36.9 °C)    No results for input(s): POCGLU in the last 72 hours. I/O (24Hr):     Intake/Output Summary (Last 24 hours) at 2/3/2022 1401  Last data filed at 2/3/2022 1046  Gross per 24 hour   Intake 609.46 ml   Output 600 ml   Net 9.46 ml       Labs:  Hematology:  Recent Labs     22  1220 22  1502 22  0538   WBC 17.6*  --  16.5*   RBC 4.10  --  3.70*   HGB 12.3  --  11.3*   HCT 37.5  --  35.2*   MCV 91.5  --  95.1   MCH 30.0  --  30.5   MCHC 32.8  --  32.1   RDW 12.7  --  12.9     --  301   MPV 9.8  --  10.4   CRP  --  12.3*  --    DDIMER --   --  0.62*     Chemistry:  Recent Labs     02/02/22  1220 02/02/22  1502 02/03/22  0538   *  --  138   K 3.5*  --  4.0   CL 97*  --  106   CO2 23  --  21   GLUCOSE 150*  --  106*   BUN 15  --  14   CREATININE 1.23*  --  0.99*   ANIONGAP 14  --  11   LABGLOM 44*  --  56*   GFRAA 53*  --  >60   CALCIUM 12.2*  --  11.0*   CAION  --  1.66*  --    PROBNP 592*  --   --    No results for input(s): PROT, LABALBU, LABA1C, A5PLDMB, W7LXIDK, FT4, TSH, AST, ALT, LDH, GGT, ALKPHOS, LABGGT, BILITOT, BILIDIR, AMMONIA, AMYLASE, LIPASE, LACTATE, CHOL, HDL, LDLCHOLESTEROL, CHOLHDLRATIO, TRIG, VLDL, VIY59SZ, PHENYTOIN, PHENYF, URICACID, POCGLU in the last 72 hours. ABG:No results found for: POCPH, PHART, PH, POCPCO2, WTC1EFM, PCO2, POCPO2, PO2ART, PO2, POCHCO3, ZVD3LET, HCO3, NBEA, PBEA, BEART, BE, THGBART, THB, MQT4DYV, OATK1YHI, P5EGLSMZ, O2SAT, FIO2  Lab Results   Component Value Date/Time    SPECIAL LEFT WRIST 12ML 02/02/2022 01:30 PM     Lab Results   Component Value Date/Time    CULTURE NO GROWTH 12 HOURS 02/02/2022 01:30 PM       Radiology:  XR CHEST (2 VW)    Result Date: 2/3/2022  Similar bibasilar opacities suggesting multifocal pneumonia     XR CHEST PORTABLE    Result Date: 2/2/2022  No prior study available for comparison. Mild basilar opacities, likely atelectatic; minimal infiltrate, particular at the right base, should be considered. No consolidation or sizable pleural effusion.        Physical Examination:        General appearance:  alert, cooperative and mild distress  Mental Status:  oriented to person, place and time and has mild memory loss  Lungs: Diminished breath sounds at bases  Heart:  regular rate and rhythm, no murmur  Abdomen:  soft, nontender, nondistended, normal bowel sounds, no masses, hepatomegaly, splenomegaly  Extremities:  no edema, redness, tenderness in the calves  Skin:  no gross lesions, rashes, induration    Assessment:        Hospital Problems           Last Modified POA    * (Principal) Pneumonia of right lower lobe due to infectious organism 2/2/2022 Yes    Dementia ([address] 2/2/2022 Yes    GERD (gastroesophageal reflux disease) 2/2/2022 Yes    Overview Signed 2/2/2022  2:00 PM by FLOYD De León CNP     Last Assessment & Plan:   Formatting of this note might be different from the original.  Assessment: managed with omeprazole         Hyperlipidemia 2/2/2022 Yes    Overview Signed 2/2/2022  2:00 PM by FLOYD De León CNP     Last Assessment & Plan:   Formatting of this note might be different from the original.  Lipid abnormalities are controlled with statin. Roslynn Mutton Pharmacotherapy as ordered. Lipids will be reassessed in 1 year. Primary hypertension 2/2/2022 Yes    Overview Signed 2/2/2022  2:00 PM by FLOYD De León CNP     Last Assessment & Plan:   Formatting of this note might be different from the original.  Assessment: good control on lisinopril; follows with outside PCP - last visit 5/26/2021         Hypercalcemia 2/2/2022 Yes    Dehydration 2/2/2022 Yes    [place] (acute kidney injury) ([address] [address] 2/2/2022 Yes    Lactic acid increased 2/2/2022 Yes          Plan:        1. D-dimer was checked and is elevated, will get CTA chest  2. Add Mucinex  3. Continue IV Rocephin and azithromycin  4. Continue DVT prophylactic dose of Lovenox for now  5.  Check urine drug screen    Flako Wyman MD  2/3/2022  2:01 PM

## 2022-02-03 NOTE — PLAN OF CARE
Problem: Safety:  Goal: Free from accidental physical injury  Description: Free from accidental physical injury  Outcome: Ongoing     Problem: Daily Care:  Goal: Daily care needs are met  Description: Daily care needs are met  Outcome: Ongoing     Problem: Breathing Pattern - Ineffective:  Goal: Ability to achieve and maintain a regular respiratory rate will improve  Description: Ability to achieve and maintain a regular respiratory rate will improve  Outcome: Ongoing     Problem: Airway Clearance - Ineffective:  Goal: Ability to maintain a clear airway will improve  Description: Ability to maintain a clear airway will improve  Outcome: Ongoing

## 2022-02-03 NOTE — PROGRESS NOTES
Physician Progress Note      PATIENT:               Fifi Oglesby  CSN #:                  [id number]  :                       1955  ADMIT DATE:       2022 11:57 AM  DISCH DATE:  RESPONDING  PROVIDER #:        Isa Santiago MD          QUERY TEXT:    Pt admitted with PNA . Pt noted to have elevated WBC and lactic acid with   tachycardia . If possible, please document in the progress notes and discharge   summary if you are evaluating and /or treating any of the following: The medical record reflects the following:  Risk Factors: PNA  Clinical Indicators:  CXR: Mild basilar opacities, likely atelectatic;   minimal infiltrate. WBC 17.6, 16.5 , initial tachy . lactic 1.7, 2.4  Treatment: IV Rocephin and Zithromax, IVF bolus 500 cc, IVF @ 75  Options provided:  -- Sepsis, present on admission  -- pneumonia without Sepsis  -- Sepsis was ruled out  -- Other - I will add my own diagnosis  -- Disagree - Not applicable / Not valid  -- Disagree - Clinically unable to determine / Unknown  -- Refer to Clinical Documentation Reviewer    PROVIDER RESPONSE TEXT:    This patient has sepsis which was present on admission.     Query created by: Diana Scott on 2/3/2022 9:46 AM      Electronically signed by:  Isa Santiago MD 2/3/2022 5:01 PM

## 2022-02-03 NOTE — CARE COORDINATION
Case Management Initial Discharge Plan  Bonifacio Tse,             Met with:patient to discuss discharge plans. Information verified: address, contacts, phone number, , insurance Yes  PCP: Chuck Feldman MD  Date of last visit: 2022    Insurance Provider: Medicare, [address]    Discharge Planning    Living Arrangements:   lives with wife   Support Systems:   family    Home has 4th story apt with elevator  0 stairs to climb to get into front door, 0 stairs to climb to reach second floor  Location of bedroom/bathroom in home  - main floor of apt    Patient able to perform ADL's:Assisted    Current Services (outpatient & in home) none  DME equipment: rollator, shower chair  DME provider: N/A    Pharmacy: [facility] [employer] on [address].    Potential Assistance Needed:   Home Care    Patient agreeable to home care: Yes  Freedom of choice provided:  yes    Prior SNF/Rehab Placement and Facility: No  Agreeable to SNF/Rehab: No  [place] of choice provided: n/a   Evaluation: n/a    Expected Discharge date:     Patient expects to be discharged to:   home  Follow Up Appointment: Best Day/ Time:      Transportation provider: wife  Transportation arrangements needed for discharge: No    Readmission Risk              Risk of Unplanned Readmission:  14             Does patient have a readmission risk score greater than 14?: No  If yes, follow-up appointment must be made within 7 days of discharge. Goal of Care:       Discharge Plan: Met with patient at bedside. Lives with wife who helps patient around the house. Has rollator at bedside and uses due to bad hips. Has had cough for past 3-4 weeks and admitted for RLL pneumonia. Rapid COVID was negative. Currently on IV Zithro and Rocephin. Therapy recommending home health and pt agreeable to [facility]. Referral called to [facility] and they can accept. SONDRA initiated.   Continue to follow for possible home O2 needs    The Plan for Transition of Care is related to the following treatment goals: SN, PT/OT    The Patient was provided with a choice of provider and agrees   with the discharge plan. [x] Yes [] No    Freedom of choice list was provided with basic dialogue that supports the patient's individualized plan of care/goals, treatment preferences and shares the quality data associated with the providers.  [x] Yes [] No         Electronically signed by Young Jerez RN on 2/3/22 at 12:00 PM EST

## 2022-02-03 NOTE — ACP (ADVANCE CARE PLANNING)
Advance Care Planning     Advance Care Planning Activator (Inpatient)  Conversation Note      Date of ACP Conversation: 2/3/2022     Conversation Conducted with: Patient with Decision Making Capacity    ACP Activator: Aide Santamaria RN    {When Decision Maker makes decisions on behalf of the incapacitated patient: Decision Maker is asked to consider and make decisions based on patient values, known preferences, or best interests. Health Care Decision Maker:     Current Designated Health Care Decision Maker:   Minor Cazadero (wife)  Phone: [phone]    Click here to complete Healthcare Decision Makers including section of the Healthcare Decision Maker Relationship (ie \"Primary\")      Care Preferences    Ventilation: \"If you were in your present state of health and suddenly became very ill and were unable to breathe on your own, what would your preference be about the use of a ventilator (breathing machine) if it were available to you? \"      Would the patient desire the use of ventilator (breathing machine)?: yes    \"If your health worsens and it becomes clear that your chance of recovery is unlikely, what would your preference be about the use of a ventilator (breathing machine) if it were available to you? \"     Would the patient desire the use of ventilator (breathing machine)?: No      Resuscitation  \"CPR works best to restart the heart when there is a sudden event, like a heart attack, in someone who is otherwise healthy. Unfortunately, CPR does not typically restart the heart for people who have serious health conditions or who are very sick. \"    \"In the event your heart stopped as a result of an underlying serious health condition, would you want attempts to be made to restart your heart (answer \"yes\" for attempt to resuscitate) or would you prefer a natural death (answer \"no\" for do not attempt to resuscitate)? \" yes       [] Yes   [x] No   Educated Patient / Decision Maker regarding differences between Advance Directives and portable DNR orders.     Length of ACP Conversation in minutes:  5    Conversation Outcomes:  [x] ACP discussion completed  [] Existing advance directive reviewed with patient; no changes to patient's previously recorded wishes  [] New Advance Directive completed  [] Portable Do Not Rescitate prepared for Provider review and signature  [] POLST/POST/MOLST/MOST prepared for Provider review and signature      Follow-up plan:    [] Schedule follow-up conversation to continue planning  [] Referred individual to Provider for additional questions/concerns   [] Advised patient/agent/surrogate to review completed ACP document and update if needed with changes in condition, patient preferences or care setting    [] This note routed to one or more involved healthcare providers

## 2022-02-04 PROBLEM — I50.9 CONGESTIVE HEART FAILURE OF UNKNOWN ETIOLOGY (HCC): Status: ACTIVE | Noted: 2022-02-04

## 2022-02-04 LAB
ANION GAP SERPL CALCULATED.3IONS-SCNC: 11 MMOL/L (ref 9–17)
BNP INTERPRETATION: ABNORMAL
BUN BLDV-MCNC: 15 MG/DL (ref 8–23)
BUN/CREAT BLD: 16 (ref 9–20)
CALCIUM IONIZED: 1.55 MMOL/L (ref 1.13–1.33)
CALCIUM SERPL-MCNC: 10.6 MG/DL (ref 8.6–10.4)
CHLORIDE BLD-SCNC: 100 MMOL/L (ref 98–107)
CO2: 25 MMOL/L (ref 20–31)
CREAT SERPL-MCNC: 0.96 MG/DL (ref 0.5–0.9)
GFR AFRICAN AMERICAN: >60 ML/MIN
GFR NON-AFRICAN AMERICAN: 58 ML/MIN
GFR SERPL CREATININE-BSD FRML MDRD: ABNORMAL ML/MIN/{1.73_M2}
GFR SERPL CREATININE-BSD FRML MDRD: ABNORMAL ML/MIN/{1.73_M2}
GLUCOSE BLD-MCNC: 97 MG/DL (ref 70–99)
LV EF: 55 %
LVEF MODALITY: NORMAL
POTASSIUM SERPL-SCNC: 3.3 MMOL/L (ref 3.7–5.3)
PRO-BNP: 1182 PG/ML
SODIUM BLD-SCNC: 136 MMOL/L (ref 135–144)

## 2022-02-04 PROCEDURE — 36415 COLL VENOUS BLD VENIPUNCTURE: CPT

## 2022-02-04 PROCEDURE — 6360000002 HC RX W HCPCS: Performed by: INTERNAL MEDICINE

## 2022-02-04 PROCEDURE — 2580000003 HC RX 258: Performed by: NURSE PRACTITIONER

## 2022-02-04 PROCEDURE — 6370000000 HC RX 637 (ALT 250 FOR IP): Performed by: NURSE PRACTITIONER

## 2022-02-04 PROCEDURE — 83880 ASSAY OF NATRIURETIC PEPTIDE: CPT

## 2022-02-04 PROCEDURE — 82330 ASSAY OF CALCIUM: CPT

## 2022-02-04 PROCEDURE — 2700000000 HC OXYGEN THERAPY PER DAY

## 2022-02-04 PROCEDURE — 6370000000 HC RX 637 (ALT 250 FOR IP): Performed by: INTERNAL MEDICINE

## 2022-02-04 PROCEDURE — 97530 THERAPEUTIC ACTIVITIES: CPT

## 2022-02-04 PROCEDURE — 1200000000 HC SEMI PRIVATE

## 2022-02-04 PROCEDURE — 93306 TTE W/DOPPLER COMPLETE: CPT

## 2022-02-04 PROCEDURE — 80048 BASIC METABOLIC PNL TOTAL CA: CPT

## 2022-02-04 PROCEDURE — 97166 OT EVAL MOD COMPLEX 45 MIN: CPT

## 2022-02-04 PROCEDURE — 97535 SELF CARE MNGMENT TRAINING: CPT

## 2022-02-04 PROCEDURE — 97116 GAIT TRAINING THERAPY: CPT

## 2022-02-04 PROCEDURE — 99232 SBSQ HOSP IP/OBS MODERATE 35: CPT | Performed by: INTERNAL MEDICINE

## 2022-02-04 PROCEDURE — 6360000002 HC RX W HCPCS: Performed by: NURSE PRACTITIONER

## 2022-02-04 PROCEDURE — 94761 N-INVAS EAR/PLS OXIMETRY MLT: CPT

## 2022-02-04 RX ORDER — LEVOFLOXACIN 500 MG/1
500 TABLET, FILM COATED ORAL DAILY
Status: DISCONTINUED | OUTPATIENT
Start: 2022-02-04 | End: 2022-02-05 | Stop reason: HOSPADM

## 2022-02-04 RX ORDER — FUROSEMIDE 10 MG/ML
40 INJECTION INTRAMUSCULAR; INTRAVENOUS DAILY
Status: DISCONTINUED | OUTPATIENT
Start: 2022-02-04 | End: 2022-02-05

## 2022-02-04 RX ADMIN — Medication 1000 UNITS: at 09:26

## 2022-02-04 RX ADMIN — Medication 5 ML: at 12:38

## 2022-02-04 RX ADMIN — SODIUM CHLORIDE, PRESERVATIVE FREE 10 ML: 5 INJECTION INTRAVENOUS at 20:57

## 2022-02-04 RX ADMIN — GUAIFENESIN 600 MG: 600 TABLET, EXTENDED RELEASE ORAL at 09:26

## 2022-02-04 RX ADMIN — GUAIFENESIN 600 MG: 600 TABLET, EXTENDED RELEASE ORAL at 20:56

## 2022-02-04 RX ADMIN — MEMANTINE 10 MG: 10 TABLET ORAL at 09:26

## 2022-02-04 RX ADMIN — PANTOPRAZOLE SODIUM 40 MG: 40 TABLET, DELAYED RELEASE ORAL at 06:05

## 2022-02-04 RX ADMIN — POTASSIUM CHLORIDE 40 MEQ: 20 TABLET, EXTENDED RELEASE ORAL at 09:45

## 2022-02-04 RX ADMIN — SODIUM CHLORIDE, PRESERVATIVE FREE 10 ML: 5 INJECTION INTRAVENOUS at 09:28

## 2022-02-04 RX ADMIN — FLUOXETINE 80 MG: 20 CAPSULE ORAL at 09:27

## 2022-02-04 RX ADMIN — LAMOTRIGINE 200 MG: 100 TABLET ORAL at 09:25

## 2022-02-04 RX ADMIN — MEMANTINE 10 MG: 10 TABLET ORAL at 20:56

## 2022-02-04 RX ADMIN — ENOXAPARIN SODIUM 40 MG: 100 INJECTION SUBCUTANEOUS at 09:27

## 2022-02-04 RX ADMIN — FUROSEMIDE 40 MG: 10 INJECTION, SOLUTION INTRAVENOUS at 09:27

## 2022-02-04 RX ADMIN — LEVOFLOXACIN 500 MG: 500 TABLET, FILM COATED ORAL at 09:26

## 2022-02-04 RX ADMIN — LAMOTRIGINE 200 MG: 100 TABLET ORAL at 20:56

## 2022-02-04 RX ADMIN — LISINOPRIL 20 MG: 20 TABLET ORAL at 09:26

## 2022-02-04 ASSESSMENT — PAIN SCALES - GENERAL
PAINLEVEL_OUTOF10: 0
PAINLEVEL_OUTOF10: 0

## 2022-02-04 NOTE — FLOWSHEET NOTE
Patient + spouse was present. Patient received a new diagnosis, CHF. Patient states worries, fears. Not sure about treatment as of yet. Patient and spouse share feelings as to the situation.  shared in presence, support, listening. Follow up as needed. 02/04/22 0356   Encounter Summary   Services provided to: Patient and family together   Referral/Consult From: [facility]   Support System Spouse   Continue Visiting   (2-4-22)   Complexity of Encounter Moderate   Length of Encounter 30 minutes   Spiritual Assessment Completed Yes   Routine   Type Follow up   Grief and Life Adjustment   Type New Diagnosis   Assessment Approachable; Anxious   Intervention Active listening;Explored feelings, thoughts, concerns;Nurtured hope;Sustaining presence/ Ministry of presence; Discussed belief system/Alevism practices/michelle;Discussed illness/injury and it's impact   Outcome Expressed gratitude;Receptive;Engaged in conversation;Expressed feelings/needs/concerns; Shared life review

## 2022-02-04 NOTE — PROGRESS NOTES
[facility]  Office: [address], DO, Leannahoang MarinHowe, DO, Jose Ramon Coni, DO, Jb Denson Blood, DO, Maddie Castillo MD, Homer Rolon MD, Dinaa Kovacs MD, Ken Go MD, Oscar Humphrey MD, Lovely Rossi MD, Tomas Vargas MD, Samuel Lovelace, DO, Yudy Kamara, DO, Sonia Tomlin MD,  Laina Vásquez, DO, Jesus Santizo MD, Michelle Kim MD, Shiloh Michel MD, Stephanie Masters MD, Ana Durbin MD, Adal Garcia, [facility], [facility], CNP, Bryan Loza, CNP, Meek Rios, CNS, Alfredo Osgood, CNP, Vanessa Alert, CNP, Parisa Madden, CNP, Arnaud Worthington, CNP, Phani Landry, CNP, PARIS PlummerC, Denia Arcos, [facility], Sebastian Gracia, [facility], Rima Coffman, CNP, Ashwini Lyon, CNP, Tong Kasper, CNP, Paul Valenzuela, CNP, Kimberli Salguero, [facility], [facility]    Progress Note    2/4/2022    2:27 PM    Name:   Bonifacio Tse  MRN:     [id number]     [place]:      [de-identified]   Room:   2022/202201   Day:  2  Admit Date:  2/2/2022 11:57 AM    PCP:   Chuck Feldman MD  Code Status:  Full Code    Subjective:     C/C:   Chief Complaint   Patient presents with   Murrel Neither Cough     Pt reports nonproductive cough xx 2 weeks      Interval History Status: .   Still having cough with difficulty expectoration  Saturating 92% on 3 L oxygen  Slightly confused, states she has developed dementia  Denies smoking but has been smoking weed before  Denies pain or fever at present  proBNP 1182  Echocardiogram result is pending  CT chest suggestive of CHF, no PE  Brief History:   Bonifacio Tse is a 78 y.o. Non- / non  female who presents with Cough (Pt reports nonproductive cough xx 2 weeks )   and is admitted to the hospital for the management of Pneumonia of right lower lobe due to infectious organism.     Patient presented to the ER with complaints of ongoing shortness of breath with consistent cough since January 21.   Patient was seen by her PCP and was prescribed prednisone and doxycycline which she has completed with no relief of her symptoms. She complains of decreased appetite, some congestion, sore throat, nausea and rib pain related to cough, and occasional wheezing. Her pertinent history includes dementia, GERD, hyperlipidemia, polyneuropathy, primary hypertension, and asthma.      She is mildly hypertensive at 163/82, heart rates in the 90s respiratory rate in the mid 20s and she is afebrile. Initial pulse ox was 86% on room air. She is 97% on 2 L nasal cannula. Chest x-ray shows mild basilar opacity, likely atelectatic with minimal infiltrate particular on the right side. She appears slightly dehydrated with mild [place]. Her normal creatinine is approximately 0.95 today she is 1.23. Sodium 134, potassium 3.5 and chloride 97. Initial lactic one-point 7 repeat was 2.4. Plan to give a 500 mL bolus. Phocal 0.09. Continue antibiotics for now. She does present with leukocytosis with a white count of 17.6. Plan to check respiratory PCR/Covid and CRP. Rapid Covid was negative. Her initial serum calcium was 12.2 ionized calcium is 1.66. Plan to recheck prior to discharge. This may be reflective of dehydration.      During my assessment the patient had difficulty answering my questions related to persistent coughing. Plan to start Tussionex and Mucinex      Review of Systems:     Constitutional:  negative for chills, fevers, sweats  Respiratory:  + for cough with difficulty expectoration, +dyspnea on exertion,   Cardiovascular:  negative for chest pain, chest pressure/discomfort, lower extremity edema, palpitations  Gastrointestinal:  negative for abdominal pain, constipation, diarrhea, nausea, vomiting  Neurological:  negative for dizziness, headache    Medications: Allergies:     Allergies   Allergen Reactions    Nsaids     Tylenol [Acetaminophen]        Current Meds:   Scheduled Meds:    furosemide  40 mg IntraVENous Daily    levoFLOXacin  500 mg Oral Daily    sodium chloride  80 mL IntraVENous Once    buPROPion  150 mg Oral QAM    FLUoxetine  80 mg Oral Daily    lamoTRIgine  200 mg Oral BID    memantine  10 mg Oral BID    Vitamin D  1,000 Units Oral Daily    lisinopril  20 mg Oral Daily    sodium chloride flush  5-40 mL IntraVENous 2 times per day    enoxaparin  40 mg SubCUTAneous Daily    guaiFENesin  600 mg Oral BID    pantoprazole  40 mg Oral QAM AC     Continuous Infusions:    [Held by provider] sodium chloride Stopped (22 1640)    sodium chloride      dextrose       PRN Meds: sodium chloride flush, sodium chloride flush, sodium chloride, ondansetron **OR** ondansetron, magnesium hydroxide, magnesium sulfate, potassium chloride **OR** potassium alternative oral replacement **OR** potassium chloride, glucose, dextrose, glucagon (rDNA), dextrose, phenol, HYDROcodone-chlorpheniramine, ipratropium-albuterol, hydrALAZINE    Data:     Past Medical History:   has a past medical history of Balance disorder, GERD (gastroesophageal reflux disease), Hyperlipemia, Hypertension, and Polyneuropathy. Social History:   reports that she has never smoked. She has never used smokeless tobacco. She reports previous drug use. Drug: Marijuana Radha Heys). She reports that she does not drink alcohol. Family History:   Family History   Problem Relation Age of Onset    Alcohol Abuse Mother     Abdominal aortic aneurysm Mother     Alcohol Abuse Father        Vitals:  /61   Pulse 78   Temp 98.1 °F (36.7 °C) (Oral)   Resp 16   Ht 5' 1\" (1.549 m)   Wt 171 lb (77.6 kg)   SpO2 92%   BMI 32.31 kg/m²   Temp (24hrs), Av.1 °F (36.7 °C), Min:97.9 °F (36.6 °C), Max:98.3 °F (36.8 °C)    No results for input(s): POCGLU in the last 72 hours. I/O (24Hr):     Intake/Output Summary (Last 24 hours) at 2022 1422  Last data filed at 2022 0952  Gross per 24 hour   Intake 711.74 ml   Output 1450 ml   Net -738.26 ml Labs:  Hematology:  Recent Labs     02/02/22  1220 02/02/22  1502 02/03/22  0538   WBC 17.6*  --  16.5*   RBC 4.10  --  3.70*   HGB 12.3  --  11.3*   HCT 37.5  --  35.2*   MCV 91.5  --  95.1   MCH 30.0  --  30.5   MCHC 32.8  --  32.1   RDW 12.7  --  12.9     --  301   MPV 9.8  --  10.4   CRP  --  12.3*  --    DDIMER  --   --  0.62*     Chemistry:  Recent Labs     02/02/22  1220 02/02/22  1502 02/03/22  0538 02/03/22  1409 02/04/22  0645 02/04/22  0842   *  --  138  --  136  --    K 3.5*  --  4.0  --  3.3*  --    CL 97*  --  106  --  100  --    CO2 23  --  21  --  25  --    GLUCOSE 150*  --  106*  --  97  --    BUN 15  --  14  --  15  --    CREATININE 1.23*  --  0.99*  --  0.96*  --    ANIONGAP 14  --  11  --  11  --    LABGLOM 44*  --  56*  --  58*  --    GFRAA 53*  --  >60  --  >60  --    CALCIUM 12.2*  --  11.0*  --  10.6*  --    CAION  --  1.66*  --  1.61*  --  1.55*   PROBNP 592*  --   --   --  1,182*  --    No results for input(s): PROT, LABALBU, LABA1C, N7FOZRI, N0APUUW, FT4, TSH, AST, ALT, LDH, GGT, ALKPHOS, LABGGT, BILITOT, BILIDIR, AMMONIA, AMYLASE, LIPASE, LACTATE, CHOL, HDL, LDLCHOLESTEROL, CHOLHDLRATIO, TRIG, VLDL, CVM53DU, PHENYTOIN, PHENYF, URICACID, POCGLU in the last 72 hours. ABG:No results found for: POCPH, PHART, PH, POCPCO2, VIL0CLQ, PCO2, POCPO2, PO2ART, PO2, POCHCO3, LRP8ACK, HCO3, NBEA, PBEA, BEART, BE, THGBART, THB, VGI5VYP, RVTU6JPV, H6ZGJKDC, O2SAT, FIO2  Lab Results   Component Value Date/Time    SPECIAL LEFT WRIST 12ML 02/02/2022 01:30 PM     Lab Results   Component Value Date/Time    CULTURE NO GROWTH 1 DAY 02/02/2022 01:30 PM       Radiology:  XR CHEST (2 VW)    Result Date: 2/3/2022  Similar bibasilar opacities suggesting multifocal pneumonia     XR CHEST PORTABLE    Result Date: 2/2/2022  No prior study available for comparison. Mild basilar opacities, likely atelectatic; minimal infiltrate, particular at the right base, should be considered.   No consolidation or sizable pleural effusion. Physical Examination:        General appearance:  alert, cooperative and mild distress  Mental Status:  oriented to person, place and time and has mild memory loss  Lungs: Diminished breath sounds at bases  Heart:  regular rate and rhythm, no murmur  Abdomen:  soft, nontender, nondistended, normal bowel sounds, no masses, hepatomegaly, splenomegaly  Extremities:  no edema, redness, tenderness in the calves  Skin:  no gross lesions, rashes, induration    Assessment:        Hospital Problems           Last Modified POA    * (Principal) Pneumonia of right lower lobe due to infectious organism 2/2/2022 Yes    Congestive heart failure of unknown etiology ([facility] [address] 2/4/2022 Yes    Dementia ([facility] [address] 2/2/2022 Yes    GERD (gastroesophageal reflux disease) 2/2/2022 Yes    Overview Signed 2/2/2022  2:00 PM by FLOYD Rose CNP     Last Assessment & Plan:   Formatting of this note might be different from the original.  Assessment: managed with omeprazole         Hyperlipidemia 2/2/2022 Yes    Overview Signed 2/2/2022  2:00 PM by FLOYD Rose CNP     Last Assessment & Plan:   Formatting of this note might be different from the original.  Lipid abnormalities are controlled with statin. Yuridia Kessler Pharmacotherapy as ordered. Lipids will be reassessed in 1 year. Primary hypertension 2/2/2022 Yes    Overview Signed 2/2/2022  2:00 PM by FLOYD Rose CNP     Last Assessment & Plan:   Formatting of this note might be different from the original.  Assessment: good control on lisinopril; follows with outside PCP - last visit 5/26/2021         Hypercalcemia 2/2/2022 Yes    Dehydration 2/2/2022 Yes    [place] (acute kidney injury) ([facility] [address] 2/2/2022 Yes    Lactic acid increased 2/2/2022 Yes          Plan:        1. Lasix 40 mg IV daily, pending final echo result  2. Continue Mucinex  3. Change antibiotics to oral Levaquin  4.  Discontinue vitamin D and other calcium products, calcium and ionic

## 2022-02-04 NOTE — PROGRESS NOTES
Physical Therapy  Facility/Department: [facility] MED SURG  Daily Treatment Note  NAME: Nigel Escobar  : 1955  MRN: [id number]    Date of Service: 2022    Discharge Recommendations:  Patient would benefit from continued therapy after discharge        Assessment   Body structures, Functions, Activity limitations: Decreased strength;Decreased safe awareness;Decreased balance;Decreased functional mobility ; Decreased endurance  Assessment: pt limited by endurance and coughing episodes  Activity Tolerance  Activity Tolerance: Patient limited by endurance     Patient Diagnosis(es): The encounter diagnosis was Pneumonia due to infectious organism, unspecified laterality, unspecified part of lung.     has a past medical history of Balance disorder, GERD (gastroesophageal reflux disease), Hyperlipemia, Hypertension, and Polyneuropathy. has a past surgical history that includes Hip Arthroplasty; Leg Tendon Surgery; Tonsillectomy; Cholecystectomy; and Lumbar spine surgery. Restrictions  Restrictions/Precautions  Restrictions/Precautions: General Precautions,Fall Risk,Up as Tolerated  Required Braces or Orthoses?: No  Position Activity Restriction  Other position/activity restrictions: 4L O2 NC, LUE IV, pulse ox, up as carolin, bed alarm  Subjective   General  Chart Reviewed: Yes  Subjective  Subjective: Pt reporting that her breathing has been better since admission but reports cough is still present. General Comment  Comments: RN and pt agreeable to therapy. Pt supine in bed upon arrival.  Pt pleasant and cooperative throughout.   Pain Screening  Patient Currently in Pain: Denies  Vital Signs  Patient Currently in Pain: Denies       Orientation     Cognition      Objective   Bed mobility  Scooting: Contact guard assistance  Transfers  Sit to Stand: Contact guard assistance  Stand to sit: Minimal Assistance  Stand Pivot Transfers: Contact guard assistance  Ambulation  Ambulation?: Yes  Ambulation 1  Surface: level tile  Device: No Device  Other Apparatus: O2 (4L )  Assistance: Contact guard assistance  Quality of Gait: fair steadiness, assist w/ line mgmt  Gait Deviations: Slow Renate;Decreased step length;Decreased step height  Distance: 20 ft x 2     Balance  Posture: Fair  Sitting - Static: Good  Sitting - Dynamic: Good  Standing - Static: +;Fair  Standing - Dynamic: Fair;-  Exercises  Comments: pt declined ex at this time                         G-Code     OutComes Score                                                     AM-PAC Score  AM-PAC Inpatient Mobility Raw Score : 17 (02/04/22 1353)  AM-PAC Inpatient T-Scale Score : 42.13 (02/04/22 1353)  Mobility Inpatient CMS 0-100% Score: 50.57 (02/04/22 1353)  Mobility Inpatient CMS G-Code Modifier : CK (02/04/22 1353)          Goals  Short term goals  Time Frame for Short term goals: 10 visits  Short term goal 1: Pt to demonstrate bed mobility and transfers w/o AD independently  Short term goal 2: Pt to ambulate at least 50ft w/o AD Joseline  Short term goal 3: Pt to verbalize energy conservation techniques and fall risk prevention strategies  Short term goal 4: Pt to actively participate in at least 30 minutes of physical therapy for ther act, ther ex, balance, gait, and endurance training  Patient Goals   Patient goals : Less coughing, to go home    Plan    Plan  Times per week: 1-2x/day, 5-6x/week  Current Treatment Recommendations: Strengthening,Balance Training,Functional Mobility Training,Stair training,Gait Training,Transfer Training,Endurance Training,Safety Education & Training,Home Exercise Program,Equipment Evaluation, Education, & procurement,Patient/Caregiver Education & Training  Safety Devices  Type of devices: Bed alarm in place,Call light within reach,Gait belt,Nurse notified,Left in bed  Restraints  Initially in place: No     Therapy Time   Individual Concurrent Group Co-treatment   Time In  1253           Time Out  1302          Minutes  9 MARCUS SHEEHAN, PTA

## 2022-02-04 NOTE — PLAN OF CARE
Problem: Infection:  Goal: Will remain free from infection  Description: Will remain free from infection  Outcome: Ongoing     Problem: Safety:  Goal: Free from accidental physical injury  Description: Free from accidental physical injury  2/3/2022 1935 by Dilan Mejía RN  Outcome: Ongoing  Goal: Free from intentional harm  Description: Free from intentional harm  Outcome: Ongoing     Problem: Daily Care:  Goal: Daily care needs are met  Description: Daily care needs are met  2/3/2022 1935 by Dilan Mejía RN  Outcome: Ongoing  Problem: Pain:  Goal: Patient's pain/discomfort is manageable  Description: Patient's pain/discomfort is manageable  Outcome: Ongoing  Note: Pain level assessment complete. Patient educated on pain scale and control interventions  PRN pain medication given per patient request  Patient instructed to call out with new onset of pain or unrelieved pain  Patient denies any pain at this time.      Problem: Skin Integrity:  Goal: Skin integrity will stabilize  Description: Skin integrity will stabilize  Outcome: Ongoing     Problem: Discharge Planning:  Goal: Patients continuum of care needs are met  Description: Patients continuum of care needs are met  Outcome: Ongoing     Problem: Breathing Pattern - Ineffective:  Goal: Ability to achieve and maintain a regular respiratory rate will improve  Description: Ability to achieve and maintain a regular respiratory rate will improve  2/3/2022 1935 by Dilan Mejía RN  Outcome: Ongoing     Problem: Discharge Planning:  Goal: Discharged to appropriate level of care  Description: Discharged to appropriate level of care  Outcome: Ongoing  Goal: Participates in care planning  Description: Participates in care planning  Outcome: Ongoing     Problem: Airway Clearance - Ineffective:  Goal: Clear lung sounds  Description: Clear lung sounds  Outcome: Ongoing  Goal: Ability to maintain a clear airway will improve  Description: Ability to maintain a clear airway will improve  2/3/2022 1935 by Carlos Alvarez RN  Outcome: Ongoing     Problem: Fluid Volume - Deficit:  Goal: Achieves intake and output within specified parameters  Description: Achieves intake and output within specified parameters  Outcome: Ongoing     Problem: Gas Exchange - Impaired:  Goal: Levels of oxygenation will improve  Description: Levels of oxygenation will improve  Outcome: Ongoing     Problem: Hyperthermia:  Goal: Ability to maintain a body temperature in the normal range will improve  Description: Ability to maintain a body temperature in the normal range will improve  Outcome: Ongoing     Problem: Tobacco Use:  Goal: Will participate in inpatient tobacco-use cessation counseling  Description: Will participate in inpatient tobacco-use cessation counseling  Outcome: Ongoing     Problem: Falls - Risk of:  Goal: Will remain free from falls  Description: Will remain free from falls  Outcome: Ongoing  Note: Siderails up x 2  Hourly rounding  Call light in reach  Instructed to call for assist before attempting out of bed.   Remains free from falls and accidental injury at this time   Floor free from obstacles  Bed is locked and in lowest position  Adequate lighting provided  Bed alarm on, [place] star and Stay with Me signs posted  Goal: Absence of physical injury  Description: Absence of physical injury  Outcome: Ongoing

## 2022-02-04 NOTE — PROGRESS NOTES
Occupational Therapy   Occupational Therapy Initial Assessment  Date: 2022   Patient Name: Savanna Ashby  MRN: [id number]     : 1955    RN Lauro Rodriguez reports patient is medically stable for therapy treatment this date. Chart reviewed prior to treatment and patient is agreeable for therapy. All lines intact and patient positioned comfortably at end of treatment. All patient needs addressed prior to ending therapy session. Due to recent hospitalization and medical condition, pt would benefit from additional therapy at time of discharge to ensure safety. Please refer to the AM-PAC score for current functional status. Date of Service: 2022    Discharge Recommendations:  Patient would benefit from continued therapy after discharge  OT Equipment Recommendations  Equipment Needed: Yes  Mobility Devices: ADL Assistive Devices  ADL Assistive Devices: Grab Bars - shower; Reacher;Long-handled Shoe Horn;Long-handled Sponge;Emergency Alert System    Assessment   Performance deficits / Impairments: Decreased functional mobility ; Decreased safe awareness;Decreased balance;Decreased coordination;Decreased ADL status; Decreased strength;Decreased high-level IADLs;Decreased endurance;Decreased vision/visual deficit; Decreased posture  Assessment: Skilled OT POC is indicated to improve overall I and safety with ADL and functional performance to return home with assist as needed.   Prognosis: Good  Decision Making: Medium Complexity  OT Education: OT Role;Plan of Care;Energy Conservation;Transfer Training  Patient Education: safety in function, call light use/fall prevention, pursed lip breathing, benefits of being up OOB as able, recommendations for continued therapy, postural control  REQUIRES OT FOLLOW UP: Yes  Activity Tolerance  Activity Tolerance: Patient limited by fatigue (limited by increased coughing and RN was informed)  Activity Tolerance: fair minus  Safety Devices  Safety Devices in place: Yes  Type of devices: Bed alarm in place; Left in bed;Patient at risk for falls;Gait belt;Nurse notified (pt requested back to bed and pillow placed under BLE's to prevent skin issues)           Patient Diagnosis(es): The encounter diagnosis was Pneumonia due to infectious organism, unspecified laterality, unspecified part of lung.     has a past medical history of Balance disorder, GERD (gastroesophageal reflux disease), Hyperlipemia, Hypertension, and Polyneuropathy. has a past surgical history that includes Hip Arthroplasty; Leg Tendon Surgery; Tonsillectomy; Cholecystectomy; and Lumbar spine surgery. PER H&P: Nigel Escobar is a 78 y.o.  Non- / non  female who presents with Cough (Pt reports nonproductive cough xx 2 weeks )   and is admitted to the hospital for the management of Pneumonia of right lower lobe due to infectious organism    Restrictions  Restrictions/Precautions  Restrictions/Precautions: General Precautions,Fall Risk,Up as Tolerated  Required Braces or Orthoses?: No  Position Activity Restriction  Other position/activity restrictions: 4L O2 NC, LUE IV, pulse ox, up as carolin, bed alarm    Subjective   General  Chart Reviewed: Yes  Patient assessed for rehabilitation services?: Yes  Family / Caregiver Present: No  *pt denies pain however with increased coughing noted throughout session/RN was informed    Social/Functional History  Social/Functional History  Lives With: Spouse (reports wife is supportive/good health and able to assist PRN)  Type of Home: Apartment (4th floor)  Home Layout: Multi-level  Home Access: Elevator,Level entry  Bathroom Shower/Tub: Tub/Shower unit  Bathroom Toilet: Handicap height  Bathroom Equipment: Shower chair,Grab bars around toilet  Home Equipment: 4 wheeled walker,Cane (uses 4WW inside home PRN and in community at all times)  Receives Help From: Family  ADL Assistance: Needs assistance (pt reports needing assist getting in/out of shower, states she does not need assist getting washed up/I with all basic self care)  Homemaking Assistance: Needs assistance  Homemaking Responsibilities: No (reports wife is doing cooking, cleaning, laundry)  Ambulation Assistance: Independent (w/4WW)  Transfer Assistance: Independent  Active : No  Patient's  Info: wife drives  Occupation: Retired  Type of occupation: school counselor  Leisure & Hobbies: read, watch tv  Additional Comments: Pt reports Hx of multiple (7sx) TONY BLE's w/ revisions and other repairs. Denies any recent falls and statets her last fall was sometime before August 2021. Objective   Vision: Impaired  Vision Exceptions: Wears glasses at all times (pt states she needs new glasses; denies changes or sx)  Hearing: Within functional limits    Orientation  Overall Orientation Status: Within Functional Limits  Observation/Palpation  Posture: Fair (with 4WW)  Observation: O2 per NC, LUE IV ;O2 sats decreased to 85% at times and pt was edu/demo on pursed lip breathing tech thoughtout session; O2 sats 91% at exit  Edema: BLE's  Balance  Sitting Balance: Supervision  Standing Balance: Stand by assistance (with [id number])  Standing Balance  Time: stand carolin 1-2 mins with 0AR  Functional Mobility  Functional - Mobility Device: 4-Wheeled Walker  Activity:  (bed to toilet, toilet to sink, sink back to bed)  Assist Level: Stand by assistance  Functional Mobility Comments: MOD-MIN cues needed for upright posture, slowing down movements/pacing, pursed lip breathing, 4WW safety/mgt, looking up and scanning room and awareness/assist with lines to increase safety/reduce falls. Toilet Transfers  Toilet - Technique: Ambulating  Equipment Used: Grab bars  Toilet Transfer: Stand by assistance  Toilet Transfers Comments: MIN cues needed for hand placement on grab bar, controlled stand to sit, pursed lip breathing and awareness/assist with lines to increase oveall safety.      ADL  Feeding: Setup  Grooming: Setup;Stand by assistance (to stand at sink for oral care and cues for proper 4WW position to increase overall safety)  UE Bathing: Setup;Stand by assistance  LE Bathing: Setup;Contact guard assistance  UE Dressing: Setup;Minimal assistance (with hosp gown)  LE Dressing: Setup;Minimal assistance (pt able to shalonda B socks seated and crossing BLE's with set up/SBA)  Toileting: Minimal assistance (with gown mgt; pt urinated and was I with hygiene seated)  Tone RUE  RUE Tone: Normotonic  Tone LUE  LUE Tone: Normotonic  Coordination  Movements Are Fluid And Coordinated: Yes  Coordination and Movement description: Fine motor impairments     Bed mobility  Supine to Sit: Stand by assistance  Sit to Supine: Stand by assistance (pt requested to get back in bed and rest and declined to sit up in chair)  Scooting: Stand by assistance  Comment: MIN cues needed for pursed lip breathing tech vs holding breath; pt with good use of rail as needed and awareness/assist with lines to increase overall safety. Transfers  Stand Step Transfers: Stand by assistance (with 7WM)  Sit to stand: Stand by assistance  Stand to sit: Stand by assistance  Transfer Comments: MOD-MIN cues needed for B hand placement reaching back to surface, upright posture, scanning, 4WW safety/mgt, locking/unlocking brakes on AD, pacing, controlled stand to sits, pursed lip breathing and awareness/assist with lines to increase oveall safety. Cognition  Overall Cognitive Status: Exceptions  Arousal/Alertness: Appropriate responses to stimuli  Following Commands:  Follows all commands without difficulty  Attention Span: Appears intact  Memory: Decreased short term memory  Safety Judgement: Decreased awareness of need for assistance;Decreased awareness of need for safety  Problem Solving: Assistance required to identify errors made;Assistance required to correct errors made;Decreased awareness of errors  Insights: Decreased awareness of deficits  Initiation: Requires cues for some  Sequencing: Requires cues for some  Perception  Overall Perceptual Status: WFL     Sensation  Overall Sensation Status: WFL        LUE AROM (degrees)  LUE AROM : WFL  RUE AROM (degrees)  RUE AROM : WFL  LUE Strength  Gross LUE Strength: Exceptions to WFL  LUE Strength Comment: BUE strength grossly 4/5  RUE Strength  Gross RUE Strength: Exceptions to [address]  Times per week: 4-5x/week 1x/day as carolin  Current Treatment Recommendations: Strengthening,Balance Training,Functional Mobility Training,Safety Education & Alexandruryuki Brawn Re-education,Patient/Caregiver Education & Training,Self-Care / ADL,Equipment Evaluation, Education, & procurement,Cognitive/Perceptual Training,Home Management Training                                                      AM-PAC Score   19          Goals  Short term goals  Time Frame for Short term goals: by discharge, pt to demo  Short term goal 1: bed mob tasks with use of rail as needed to MOD I. Short term goal 2: increase BUE strength by a 1/2 grade to assist with ADL tasks and be I with BUE HEP with use of handouts. Short term goal 3: total body ADL tasks with use of AD/AE to SUP-MOD I. Short term goal 4: ADL transfers and functional mob with AD to SUP-MOD I. Short term goal 5: standing to SUP with AD carolin > 5 mins as able to reduce falls in function. Long term goals  Time Frame for Long term goals : Pt/caregivers to be I with pressure relief, EC/WS and fall prevention tech, and DME/AE recommendations with use of handouts. Patient Goals   Patient goals : Pt states to be more mobile!        Therapy Time   Individual Concurrent Group Co-treatment   Time In 0726 (plus 10 min chart review/nursing communication)         Time Out 0809         Minutes 43=53 mins total          Timed Code Treatment Minutes: 30 Minutes       Yesica Sotelo OT

## 2022-02-04 NOTE — PLAN OF CARE
Problem: Safety:  Goal: Free from accidental physical injury  Description: Free from accidental physical injury  2/4/2022 0256 by Yohana Garrett RN  Outcome: Ongoing     Problem: Daily Care:  Goal: Daily care needs are met  Description: Daily care needs are met  2/4/2022 0256 by Yohana Garrett RN  Outcome: Ongoing     Problem: Pain:  Goal: Patient's pain/discomfort is manageable  Description: Patient's pain/discomfort is manageable  2/4/2022 0256 by Yohana Garrett RN  Outcome: Ongoing     Problem: Gas Exchange - Impaired:  Goal: Levels of oxygenation will improve  Description: Levels of oxygenation will improve  2/4/2022 0256 by Yohana Garrett RN  Outcome: Ongoing

## 2022-02-05 VITALS
OXYGEN SATURATION: 96 % | HEART RATE: 75 BPM | SYSTOLIC BLOOD PRESSURE: 94 MMHG | DIASTOLIC BLOOD PRESSURE: 69 MMHG | RESPIRATION RATE: 18 BRPM | WEIGHT: 172 LBS | BODY MASS INDEX: 32.47 KG/M2 | HEIGHT: 61 IN | TEMPERATURE: 97.9 F

## 2022-02-05 LAB
ANION GAP SERPL CALCULATED.3IONS-SCNC: 13 MMOL/L (ref 9–17)
BUN BLDV-MCNC: 16 MG/DL (ref 8–23)
BUN/CREAT BLD: 13 (ref 9–20)
CALCIUM IONIZED: 1.56 MMOL/L (ref 1.13–1.33)
CALCIUM SERPL-MCNC: 11.7 MG/DL (ref 8.6–10.4)
CHLORIDE BLD-SCNC: 97 MMOL/L (ref 98–107)
CO2: 24 MMOL/L (ref 20–31)
CREAT SERPL-MCNC: 1.23 MG/DL (ref 0.5–0.9)
GFR AFRICAN AMERICAN: 53 ML/MIN
GFR NON-AFRICAN AMERICAN: 44 ML/MIN
GFR SERPL CREATININE-BSD FRML MDRD: ABNORMAL ML/MIN/{1.73_M2}
GFR SERPL CREATININE-BSD FRML MDRD: ABNORMAL ML/MIN/{1.73_M2}
GLUCOSE BLD-MCNC: 134 MG/DL (ref 70–99)
POTASSIUM SERPL-SCNC: 3.4 MMOL/L (ref 3.7–5.3)
PTH INTACT: 6.38 PG/ML (ref 15–65)
SODIUM BLD-SCNC: 134 MMOL/L (ref 135–144)

## 2022-02-05 PROCEDURE — 83970 ASSAY OF PARATHORMONE: CPT

## 2022-02-05 PROCEDURE — 6360000002 HC RX W HCPCS: Performed by: NURSE PRACTITIONER

## 2022-02-05 PROCEDURE — 2580000003 HC RX 258: Performed by: NURSE PRACTITIONER

## 2022-02-05 PROCEDURE — 6370000000 HC RX 637 (ALT 250 FOR IP): Performed by: NURSE PRACTITIONER

## 2022-02-05 PROCEDURE — 99239 HOSP IP/OBS DSCHRG MGMT >30: CPT | Performed by: INTERNAL MEDICINE

## 2022-02-05 PROCEDURE — 97530 THERAPEUTIC ACTIVITIES: CPT

## 2022-02-05 PROCEDURE — 6360000002 HC RX W HCPCS: Performed by: INTERNAL MEDICINE

## 2022-02-05 PROCEDURE — 6370000000 HC RX 637 (ALT 250 FOR IP): Performed by: INTERNAL MEDICINE

## 2022-02-05 PROCEDURE — 80048 BASIC METABOLIC PNL TOTAL CA: CPT

## 2022-02-05 PROCEDURE — 97116 GAIT TRAINING THERAPY: CPT

## 2022-02-05 PROCEDURE — 82330 ASSAY OF CALCIUM: CPT

## 2022-02-05 PROCEDURE — 36415 COLL VENOUS BLD VENIPUNCTURE: CPT

## 2022-02-05 RX ORDER — FUROSEMIDE 40 MG/1
40 TABLET ORAL DAILY
Qty: 60 TABLET | Refills: 1 | Status: SHIPPED | OUTPATIENT
Start: 2022-02-06 | End: 2022-03-08

## 2022-02-05 RX ORDER — FUROSEMIDE 40 MG/1
40 TABLET ORAL DAILY
Status: DISCONTINUED | OUTPATIENT
Start: 2022-02-06 | End: 2022-02-05 | Stop reason: HOSPADM

## 2022-02-05 RX ORDER — POTASSIUM CHLORIDE 20 MEQ/1
20 TABLET, EXTENDED RELEASE ORAL DAILY
Qty: 30 TABLET | Refills: 1 | Status: SHIPPED | OUTPATIENT
Start: 2022-02-05

## 2022-02-05 RX ORDER — LEVOFLOXACIN 500 MG/1
500 TABLET, FILM COATED ORAL DAILY
Qty: 5 TABLET | Refills: 0 | Status: SHIPPED | OUTPATIENT
Start: 2022-02-06 | End: 2022-02-11

## 2022-02-05 RX ORDER — GUAIFENESIN 600 MG/1
600 TABLET, EXTENDED RELEASE ORAL 2 TIMES DAILY
Qty: 14 TABLET | Refills: 0 | Status: SHIPPED | OUTPATIENT
Start: 2022-02-05 | End: 2022-02-12

## 2022-02-05 RX ADMIN — FUROSEMIDE 40 MG: 10 INJECTION, SOLUTION INTRAVENOUS at 08:57

## 2022-02-05 RX ADMIN — FLUOXETINE 80 MG: 20 CAPSULE ORAL at 08:58

## 2022-02-05 RX ADMIN — SODIUM CHLORIDE, PRESERVATIVE FREE 10 ML: 5 INJECTION INTRAVENOUS at 09:00

## 2022-02-05 RX ADMIN — LISINOPRIL 20 MG: 20 TABLET ORAL at 08:57

## 2022-02-05 RX ADMIN — LEVOFLOXACIN 500 MG: 500 TABLET, FILM COATED ORAL at 09:00

## 2022-02-05 RX ADMIN — GUAIFENESIN 600 MG: 600 TABLET, EXTENDED RELEASE ORAL at 08:58

## 2022-02-05 RX ADMIN — MEMANTINE 10 MG: 10 TABLET ORAL at 09:00

## 2022-02-05 RX ADMIN — ENOXAPARIN SODIUM 40 MG: 100 INJECTION SUBCUTANEOUS at 08:58

## 2022-02-05 RX ADMIN — LAMOTRIGINE 200 MG: 100 TABLET ORAL at 08:58

## 2022-02-05 RX ADMIN — BUPROPION HYDROCHLORIDE 150 MG: 150 TABLET, EXTENDED RELEASE ORAL at 09:00

## 2022-02-05 RX ADMIN — PANTOPRAZOLE SODIUM 40 MG: 40 TABLET, DELAYED RELEASE ORAL at 06:04

## 2022-02-05 NOTE — PLAN OF CARE
Problem: Safety:  Goal: Free from accidental physical injury  Description: Free from accidental physical injury  Outcome: Ongoing  Note: Patient is free from injury this shift. Problem: Daily Care:  Goal: Daily care needs are met  Description: Daily care needs are met  Outcome: Ongoing  Note: Patient has all daily care met as are needed.

## 2022-02-05 NOTE — PROGRESS NOTES
Caregiver Present: No  Subjective  Subjective: Patient agreeable to PT treatment. General Comment  Comments: NAVYA Leary states patient is appropriate for PT treatment  Pain Screening  Patient Currently in Pain: Denies  Vital Signs  Patient Currently in Pain: Denies       Orientation  Orientation  Overall Orientation Status: Within Functional Limits  Cognition      Objective   Bed mobility  Rolling to Right: Contact guard assistance  Supine to Sit: Contact guard assistance  Sit to Supine: Contact guard assistance  Scooting: Contact guard assistance  Comment: Min vc's for positioning and proper hand placement for progression to EOB seated posture. Patient requires vc's to scoot all the way out and place feet flat on the floor to increase support and stability. Transfers  Sit to Stand: Contact guard assistance  Stand to sit: Contact guard assistance  Bed to Chair: Contact guard assistance  Stand Pivot Transfers: Contact guard assistance  Comment: Patient requires min vc's to keep assistive device close and back all the way up to level surface before reaching back to arm of chair then to slowly down to seated posture. Ambulation  Ambulation?: Yes  More Ambulation?: No  Ambulation 1  Surface: level tile  Device: Rollator  Other Apparatus: O2  Assistance: Contact guard assistance  Quality of Gait: fair steadiness, assist w/ line mgmt  Gait Deviations: Slow Renate;Decreased step length;Decreased step height  Distance: 40 feet x1  Comments: Pt ambulating w/ fair steadiness w/ rollator this date requiring max verbal cueing for safety w/ line mgmt throughout. Neuromuscular Education  NDT Treatment: Gait ;Lower extremity; Sitting;Standing  Balance  Posture: Fair  Sitting - Dynamic: Good  Standing - Static: +;Fair  Standing - Dynamic: Fair  Single Leg Stance R Le  Single Leg Stance L Le  Comments: balance assessed with rollator. Exercises  Comments: Printed seated HEP and Heart failure print out.   Exercises included Seated Hip Abduction, seated marches, Seated ankle pumps, seated LAQ, Seated Glute sets, Standing with counter support hip abduction, Standing with counter support heel/toe raises, Standing with counter support marches, Standing with counter support 3 way hip. Patient with good understanding of exercises and techniques. Patient educated inspirameter use and frequency of.      AM-PAC Score  AM-PAC Inpatient Mobility Raw Score : 17 (02/05/22 1320)  AM-PAC Inpatient T-Scale Score : 42.13 (02/05/22 1320)  Mobility Inpatient CMS 0-100% Score: 50.57 (02/05/22 1320)  Mobility Inpatient CMS G-Code Modifier : CK (02/05/22 1320)          Goals  Short term goals  Time Frame for Short term goals: 10 visits  Short term goal 1: Pt to demonstrate bed mobility and transfers w/o AD independently  Short term goal 2: Pt to ambulate at least 50ft w/o AD Joseline  Short term goal 3: Pt to verbalize energy conservation techniques and fall risk prevention strategies  Short term goal 4: Pt to actively participate in at least 30 minutes of physical therapy for ther act, ther ex, balance, gait, and endurance training  Patient Goals   Patient goals : Less coughing, to go home    Plan    Plan  Times per week: 1-2x/day, 5-6x/week  Current Treatment Recommendations: Strengthening,Balance Training,Functional Mobility Training,Stair training,Gait Training,Transfer Training,Endurance Training,Safety Education & Training,Home Exercise Program,Equipment Evaluation, Education, & procurement,Patient/Caregiver Education & Training  Safety Devices  Type of devices: Call light within reach,Gait belt,Nurse notified,Left in bed  Restraints  Initially in place: No     Therapy Time   Individual Concurrent Group Co-treatment   Time In 1210         Time Out 1248         Minutes [id number] [place], [place]

## 2022-02-05 NOTE — PROGRESS NOTES
[facility]  Office: [address], DO, Mariela Faust, DO, Rosemary Cárdenas, DO, Rileypollo Ogfilippo Bright, DO, Jeny Tuttle MD, Radha Pena MD, Mata Buchanan MD, Page Puentes MD, Donna Mcginnis MD, Nasima Briggs MD, Nestor Eng MD, Leeanne Del Rosario DO, Mayra Ramires, DO, Andriy Barba MD,  Jessica Bo DO, Mikayla Pinedo MD, Aleta Partida MD, Gudelia Alberto MD, Micaela Sevilla MD, Velasquez Ahumada MD, Papa Cyr, [facility], [facility], CNP, Ross Bradshaw, CNP, Martinez Fraga, CNS, Katherine Daniel, CNP, Zaki Cuellar, CNP, Juanita Velasco, CNP, Arsenio Hogan, CNP, Ronny Khan, CNP, Waylon Segal PA-C, Humberto Tomlin, [facility], Nehemiah Serrano [facility], Noble Leonard, CNP, Noemi Rice, CNP, Perico Lo, CNP, Darrel Chambers, CNP, Ion Gilliam, CNP, Naveed Briceño    Progress Note    2/5/2022    12:30 PM    Name:   Yojana Marquez  MRN:     [id number]     [place]:      [de-identified]   Room:   2022/2022-01   Day:  3  Admit Date:  2/2/2022 11:57 AM    PCP:   Durga Sweeney MD  Code Status:  Full Code    Subjective:     C/C:   Chief Complaint   Patient presents with    Cough     Pt reports nonproductive cough xx 2 weeks      Interval History Status: . Cough and difficulty expectoration has improved  Saturating 95 % on 2 L oxygen, qualified for home oxygen   states she has some dementia  Denies smoking but has been smoking weed before  Denies pain or fever at present  proBNP 1182  Echocardiogram shows EF of 55%  Calcium 11.7, ionic calcium 1.56  PTH was ordered, not resulted yet  Her home calcium products were stopped yesterday  CT chest was suggestive of CHF, no PE-responding well to treatment  Brief History:   Yojana Marquez is a 78 y.o.  Non- / non  female who presents with Cough (Pt reports nonproductive cough xx 2 weeks )   and is admitted to the hospital for the management of Pneumonia of right lower lobe due to infectious organism.     Patient presented to the ER with complaints of ongoing shortness of breath with consistent cough since January 21. Patient was seen by her PCP and was prescribed prednisone and doxycycline which she has completed with no relief of her symptoms. She complains of decreased appetite, some congestion, sore throat, nausea and rib pain related to cough, and occasional wheezing. Her pertinent history includes dementia, GERD, hyperlipidemia, polyneuropathy, primary hypertension, and asthma.      She is mildly hypertensive at 163/82, heart rates in the 90s respiratory rate in the mid 20s and she is afebrile. Initial pulse ox was 86% on room air. She is 97% on 2 L nasal cannula. Chest x-ray shows mild basilar opacity, likely atelectatic with minimal infiltrate particular on the right side. She appears slightly dehydrated with mild NY. Her normal creatinine is approximately 0.95 today she is 1.23. Sodium 134, potassium 3.5 and chloride 97. Initial lactic one-point 7 repeat was 2.4. Plan to give a 500 mL bolus. Phocal 0.09. Continue antibiotics for now. She does present with leukocytosis with a white count of 17.6. Plan to check respiratory PCR/Covid and CRP. Rapid Covid was negative. Her initial serum calcium was 12.2 ionized calcium is 1.66. Plan to recheck prior to discharge. This may be reflective of dehydration.      During my assessment the patient had difficulty answering my questions related to persistent coughing.   Plan to start Tussionex and Mucinex      Review of Systems:     Constitutional:  negative for chills, fevers, sweats  Respiratory:  + for cough with difficulty expectoration-improved, +dyspnea on exertion-improved,   Cardiovascular:  negative for chest pain, chest pressure/discomfort, lower extremity edema, palpitations  Gastrointestinal:  negative for abdominal pain, constipation, diarrhea, nausea, vomiting  Neurological:  negative for dizziness, headache    Medications: Allergies: Allergies   Allergen Reactions    Nsaids     Tylenol [Acetaminophen]        Current Meds:   Scheduled Meds:    [START ON 2022] furosemide  40 mg Oral Daily    levoFLOXacin  500 mg Oral Daily    sodium chloride  80 mL IntraVENous Once    buPROPion  150 mg Oral QAM    FLUoxetine  80 mg Oral Daily    lamoTRIgine  200 mg Oral BID    memantine  10 mg Oral BID    lisinopril  20 mg Oral Daily    sodium chloride flush  5-40 mL IntraVENous 2 times per day    enoxaparin  40 mg SubCUTAneous Daily    guaiFENesin  600 mg Oral BID    pantoprazole  40 mg Oral QAM AC     Continuous Infusions:    sodium chloride      dextrose       PRN Meds: sodium chloride flush, sodium chloride flush, sodium chloride, ondansetron **OR** ondansetron, magnesium hydroxide, magnesium sulfate, potassium chloride **OR** potassium alternative oral replacement **OR** potassium chloride, glucose, dextrose, glucagon (rDNA), dextrose, phenol, HYDROcodone-chlorpheniramine, ipratropium-albuterol, hydrALAZINE    Data:     Past Medical History:   has a past medical history of Balance disorder, GERD (gastroesophageal reflux disease), Hyperlipemia, Hypertension, and Polyneuropathy. Social History:   reports that she has never smoked. She has never used smokeless tobacco. She reports previous drug use. Drug: Marijuana [place]). She reports that she does not drink alcohol. Family History:   Family History   Problem Relation Age of Onset    Alcohol Abuse Mother     Abdominal aortic aneurysm Mother     Alcohol Abuse Father        Vitals:  BP 94/69   Pulse 75   Temp 97.9 °F (36.6 °C) (Oral)   Resp 18   Ht 5' 1\" (1.549 m)   Wt 172 lb (78 kg)   SpO2 96%   BMI 32.50 kg/m²   Temp (24hrs), Av.1 °F (36.7 °C), Min:97.9 °F (36.6 °C), Max:98.4 °F (36.9 °C)    No results for input(s): POCGLU in the last 72 hours. I/O (24Hr):     Intake/Output Summary (Last 24 hours) at 2022 1230  Last data filed at 2/5/2022 0847  Gross per 24 hour   Intake --   Output 1150 ml   Net -1150 ml       Labs:  Hematology:  Recent Labs     02/02/22  1502 02/03/22  0538   WBC  --  16.5*   RBC  --  3.70*   HGB  --  11.3*   HCT  --  35.2*   MCV  --  95.1   MCH  --  30.5   MCHC  --  32.1   RDW  --  12.9   PLT  --  301   MPV  --  10.4   CRP 12.3*  --    DDIMER  --  0.62*     Chemistry:  Recent Labs     02/02/22  1502 02/03/22  0538 02/03/22  1409 02/04/22  0645 02/04/22  0842 02/05/22  0618   NA  --  138  --  136  --  134*   K  --  4.0  --  3.3*  --  3.4*   CL  --  106  --  100  --  97*   CO2  --  21  --  25  --  24   GLUCOSE  --  106*  --  97  --  134*   BUN  --  14  --  15  --  16   CREATININE  --  0.99*  --  0.96*  --  1.23*   ANIONGAP  --  11  --  11  --  13   LABGLOM  --  56*  --  58*  --  44*   GFRAA  --  >60  --  >60  --  53*   CALCIUM  --  11.0*  --  10.6*  --  11.7*   CAION   < >  --  1.61*  --  1.55* 1.56*   PROBNP  --   --   --  1,182*  --   --     < > = values in this interval not displayed. No results for input(s): PROT, LABALBU, LABA1C, U7GKJEI, H8KBLLU, FT4, TSH, AST, ALT, LDH, GGT, ALKPHOS, LABGGT, BILITOT, BILIDIR, AMMONIA, AMYLASE, LIPASE, LACTATE, CHOL, HDL, LDLCHOLESTEROL, CHOLHDLRATIO, TRIG, VLDL, DFV65XA, PHENYTOIN, PHENYF, URICACID, POCGLU in the last 72 hours. ABG:No results found for: POCPH, PHART, PH, POCPCO2, KMB8VAK, PCO2, POCPO2, PO2ART, PO2, POCHCO3, LZY9BCT, HCO3, NBEA, PBEA, BEART, BE, THGBART, THB, ALT1ECG, LQAC6WKN, J2SDZBTY, O2SAT, FIO2  Lab Results   Component Value Date/Time    SPECIAL LEFT WRIST 12ML 02/02/2022 01:30 PM     Lab Results   Component Value Date/Time    CULTURE NO GROWTH 2 DAYS 02/02/2022 01:30 PM       Radiology:  XR CHEST (2 VW)    Result Date: 2/3/2022  Similar bibasilar opacities suggesting multifocal pneumonia     XR CHEST PORTABLE    Result Date: 2/2/2022  No prior study available for comparison.   Mild basilar opacities, likely atelectatic; minimal infiltrate, particular at the right base, should be considered. No consolidation or sizable pleural effusion. Physical Examination:        General appearance:  alert, cooperative and no distress  Mental Status:  oriented to person, place and time and has mild memory loss  Lungs: Diminished breath sounds at bases, no wheezes, no rales  Heart:  regular rate and rhythm, no murmur  Abdomen:  soft, nontender, nondistended, normal bowel sounds, no masses, hepatomegaly, splenomegaly  Extremities:  no edema, redness, tenderness in the calves  Skin:  no gross lesions, rashes, induration    Assessment:        Hospital Problems           Last Modified POA    * (Principal) Pneumonia of right lower lobe due to infectious organism 2/2/2022 Yes    Acute diastolic congestive heart failure  2/5/2022 Yes    Dementia ([address] 2/2/2022 Yes    GERD (gastroesophageal reflux disease) 2/2/2022 Yes    Overview Signed 2/2/2022  2:00 PM by FLOYD Styles CNP     Last Assessment & Plan:   Formatting of this note might be different from the original.  Assessment: managed with omeprazole         Hyperlipidemia 2/2/2022 Yes    Overview Signed 2/2/2022  2:00 PM by FLOYD Styles CNP     Last Assessment & Plan:   Formatting of this note might be different from the original.  Lipid abnormalities are controlled with statin. Meryle Sandman Pharmacotherapy as ordered. Lipids will be reassessed in 1 year. Primary hypertension 2/2/2022 Yes    Overview Signed 2/2/2022  2:00 PM by FLOYD Styles CNP     Last Assessment & Plan:   Formatting of this note might be different from the original.  Assessment: good control on lisinopril; follows with outside PCP - last visit 5/26/2021         Hypercalcemia 2/2/2022 Yes    Dehydration 2/2/2022 Yes    NY (acute kidney injury) ([address] 2/2/2022 Yes    Lactic acid increased 2/2/2022 Yes          Plan:        1. Switch to Lasix 40 mg orally daily  2. Continue Mucinex  3.  Changed antibiotics to oral Levaquin  4. Discontinued vitamin D and other calcium products, calcium and ionic calcium is high  5. Continue DVT prophylactic dose of Lovenox for now  6. Qualified for home oxygen  7. Recheck BMP in 1 week and follow-up with PCP for calcium and intact PTH levels to evaluate further if levels remain high    DME oxygen  Patient was evaluated today for the diagnosis of CHF. I entered a DME order for home oxygen because the diagnosis and testing requires the patient to have supplemental oxygen. Condition will improve or be benefited by oxygen use. The patient is  able to perform good mobility in a home setting and therefore does require the use of a portable oxygen system. The need for this equipment was discussed with the patient and she understands and is in agreement.       Khurram Feldman MD  2/5/2022  12:30 PM

## 2022-02-05 NOTE — PROGRESS NOTES
Home Oxygen Evaluation    Home Oxygen Evaluation completed. Patient is on 2 liters per minute via nasal cannula.   Resting SpO2 = 95%  Resting SpO2 on room air = 88%    SpO2 on room air with exercise = na%  SpO2 on oxygen as above with exercise = 92%    Test performed at [facility], [place]    kira rangel RCP  10:06 AM

## 2022-02-05 NOTE — DISCHARGE SUMMARY
[facility]  Office: [address], , Grodon Harden DO, Celso Real DO, Katja Bright DO, Kathryn Blanton MD, Tevin Bender MD, Veto Pinedo MD, Cesar Ozuna MD, Gabrielle Brown MD, Jeanine Woodruff MD, Santhosh Mathews MD, Rod Kuo DO, Larisa Lindsay DO, Chesley Saint, MD,  Glynda Fothergill, DO, Eder Urias MD, Regina Chan MD, Zoila Chacon MD, Radhames Reynolds MD, Lemuel Munoz MD, Roseline Gonzalez [facility], [facility], CNP, Dennie Hageman, CNP, Shelly Guerrier, CNS, Scott Burciaga, CNP, Ifeanyi Blum, CNP, Felicitas Bueno, CNP, Ramiro Aguillon, CNP, Kelsi Carr, CNP, Tim Hylton PA-C, Joseph Fernandez, [facility], Vita Hendricks, [facility], Gilmer Eastman, CNP, Bam Bates, CNP, Hiren Orellana, CNP, Rhina Orosco, CNP, Comfort Hernandez, CNP, Hawa Vallejo, [id number]    Discharge Summary     Patient ID: Brandi Luke  :  1955   MRN: [id number]     ACCOUNT:  [de-identified]   Patient's PCP: Mala Beck MD  Admit Date: 2022   Discharge Date: 2022   Length of Stay: 3  Code Status:  Full Code  Admitting Physician: Shoshana Hannon MD  Discharge Physician: Shoshana Hannon MD     Active Discharge Diagnoses:     Hospital Problem Lists:  Principal Problem:    Pneumonia of right lower lobe due to infectious organism  Active Problems:    Acute diastolic congestive heart failure     Dementia (HCC)    GERD (gastroesophageal reflux disease)    Hyperlipidemia    Primary hypertension    Hypercalcemia    Dehydration    [address] (acute kidney injury) [address]    Lactic acid increased  Resolved Problems:    * No resolved hospital problems.  *      Admission Condition:  poor     Discharged Condition: stable    Hospital Stay:   Admitting history:  Lucy Narayan a 78 y.o. Non- / non  female who presents with Cough (Pt reports nonproductive cough xx 2 weeks )   and is admitted to the hospital for the management of Pneumonia of right lower lobe due to infectious organism.     Patient presented to the ER with complaints of ongoing shortness of breath with consistent cough since January 21.  Patient was seen by her PCP and was prescribed prednisone and doxycycline which she has completed with no relief of her symptoms.  She complains of decreased appetite, some congestion, sore throat, nausea and rib pain related to cough, and occasional wheezing.  Her pertinent history includes dementia, GERD, hyperlipidemia, polyneuropathy, primary hypertension, and asthma.      She is mildly hypertensive at 163/82, heart rates in the 90s respiratory rate in the mid 20s and she is afebrile.  Initial pulse ox was 86% on room air.  She is 97% on 2 L nasal cannula.  Chest x-ray shows mild basilar opacity, likely atelectatic with minimal infiltrate particular on the right side.  She appears slightly dehydrated with mild NY.  Her normal creatinine is approximately 0.95 today she is 1.23.  Sodium 134, potassium 3.5 and chloride 97.  Initial lactic one-point 7 repeat was 2.4.  Plan to give a 500 mL bolus.  Phocal 0.09.  Continue antibiotics for now. Ronda Gilbert does present with leukocytosis with a white count of 17.6.  Plan to check respiratory PCR/Covid and CRP.  Rapid Covid was negative.  Her initial serum calcium was 12.2 ionized calcium is 1.66.  Plan to recheck prior to discharge.  This may be reflective of dehydration.      During my assessment the patient had difficulty answering my questions related to persistent coughing.  Plan to start Tussionex and Mucinex     Hospital Course:   Cough and difficulty expectoration has improved  Saturating 95 % on 2 L oxygen, qualified for home oxygen   states she has some dementia  Denies smoking but has been smoking weed before  Denies pain or fever at present  proBNP 1182  Echocardiogram shows EF of 55%  Calcium 11.7, ionic calcium 1.56  PTH was ordered, not resulted yet  Her home calcium products were stopped yesterday  CT chest was suggestive of CHF, no PE-responding well to treatment    Plan:         1. Switch to Lasix 40 mg orally daily  2. Continue Mucinex  3. Changed antibiotics to oral Levaquin  4. Discontinued vitamin D and other calcium products, calcium and ionic calcium is high  5. Continue DVT prophylactic dose of Lovenox for now  6. Qualified for home oxygen  7. Recheck BMP in 1 week and follow-up with PCP for calcium and intact PTH levels to evaluate further if levels remain high     DME oxygen  Patient was evaluated today for the diagnosis of CHF. I entered a DME order for home oxygen because the diagnosis and testing requires the patient to have supplemental oxygen. Condition will improve or be benefited by oxygen use. The patient is  able to perform good mobility in a home setting and therefore does require the use of a portable oxygen system. The need for this equipment was discussed with the patient and she understands and is in agreement.       Significant therapeutic interventions: See above notes    Significant Diagnostic Studies:   Labs / Micro:  CBC:   Lab Results   Component Value Date    WBC 16.5 02/03/2022    RBC 3.70 02/03/2022    HGB 11.3 02/03/2022    HCT 35.2 02/03/2022    MCV 95.1 02/03/2022    MCH 30.5 02/03/2022    MCHC 32.1 02/03/2022    RDW 12.9 02/03/2022     02/03/2022     BMP:    Lab Results   Component Value Date    GLUCOSE 134 02/05/2022     02/05/2022    K 3.4 02/05/2022    CL 97 02/05/2022    CO2 24 02/05/2022    ANIONGAP 13 02/05/2022    BUN 16 02/05/2022    CREATININE 1.23 02/05/2022    BUNCRER 13 02/05/2022    CALCIUM 11.7 02/05/2022    LABGLOM 44 02/05/2022    GFRAA 53 02/05/2022    GFR      02/05/2022    GFR NOT REPORTED 02/05/2022     HFP:  No results found for: ALB, PROT  CMP:    Lab Results   Component Value Date    GLUCOSE 134 02/05/2022     02/05/2022    K 3.4 02/05/2022    CL 97 02/05/2022    CO2 24 02/05/2022    BUN 16 02/05/2022    CREATININE 1.23 02/05/2022    ANIONGAP 13 02/05/2022    LABGLOM 44 02/05/2022    GFRAA 53 02/05/2022    GFR      02/05/2022    GFR NOT REPORTED 02/05/2022    CALCIUM 11.7 02/05/2022     PT/INR:  No results found for: PTINR, PROTIME, INR  PTT: No results found for: APTT  FLP:  No results found for: CHOL, TRIG, HDL  U/A:  No results found for: Dayla Alvarado, SPECGRAV, HGBUR, PHUR, PROTEINU, GLUCOSEU, KETUA, BILIRUBINUR, UROBILINOGEN, NITRU, LEUKOCYTESUR  TSH:  No results found for: TSH     Radiology:  XR CHEST (2 VW)    Result Date: 2/3/2022  Similar bibasilar opacities suggesting multifocal pneumonia     XR CHEST PORTABLE    Result Date: 2/2/2022  No prior study available for comparison. Mild basilar opacities, likely atelectatic; minimal infiltrate, particular at the right base, should be considered. No consolidation or sizable pleural effusion. CT CHEST PULMONARY EMBOLISM W CONTRAST    Result Date: 2/3/2022  No evidence for pulmonary embolism. Mosaic pattern of ground-glass infiltrates in both lung bases, with thickened interlobular septa suggesting congestive heart failure. Consultations:    Consults:     Final Specialist Recommendations/Findings:   IP CONSULT TO HOSPITALIST      The patient was seen and examined on day of discharge and this discharge summary is in conjunction with any daily progress note from day of discharge.     Discharge plan:     Disposition: Home with home care    Physician Follow Up:     [place] [address] [place] [address]  CULLEN Coon MD  [facility] [address]  [phone]    Call  for followup appointment in 1 to 2 weeks       Requiring Further Evaluation/Follow Up POST HOSPITALIZATION/Incidental Findings: Use oxygen as directed    Diet: cardiac diet    Activity: As tolerated    Instructions to Patient: John Powell off antibiotics as directed  Follow-up with PCP with repeat BMP and PTH results for further work-up of hypercalcemia if needed    Discharge Medications:      Medication List      START taking these medications    furosemide 40 MG tablet  Commonly known as: LASIX  Take 1 tablet by mouth daily  Start taking on: February 6, 2022     guaiFENesin 600 MG extended release tablet  Commonly known as: MUCINEX  Take 1 tablet by mouth 2 times daily for 7 days     levoFLOXacin 500 MG tablet  Commonly known as: LEVAQUIN  Take 1 tablet by mouth daily for 5 days  Start taking on: February 6, 2022     potassium chloride 20 MEQ extended release tablet  Commonly known as: KLOR-CON M  Take 1 tablet by mouth daily        CONTINUE taking these medications    buPROPion 150 MG extended release tablet  Commonly known as: WELLBUTRIN XL     lamoTRIgine 200 MG tablet  Commonly known as: LAMICTAL     lisinopril 20 MG tablet  Commonly known as: PRINIVIL;ZESTRIL     melatonin 3 MG Tabs tablet     memantine 10 MG tablet  Commonly known as: NAMENDA     omeprazole 20 MG delayed release capsule  Commonly known as: PRILOSEC     oxyCODONE 5 MG immediate release tablet  Commonly known as: ROXICODONE     PROzac 40 MG capsule  Generic drug: FLUoxetine     psyllium 58.6 % packet  Commonly known as: METAMUCIL     therapeutic multivitamin-minerals tablet     vitamin C 500 MG tablet  Commonly known as: ASCORBIC ACID        STOP taking these medications    NONFORMULARY     traMADol 50 MG tablet  Commonly known as: ULTRAM     vitamin D 25 MCG (1000 UT) Tabs tablet  Commonly known as: CHOLECALCIFEROL           Where to Get Your Medications      These medications were sent to [address], [facility]  [place] [address]    Phone: [phone]   · furosemide 40 MG tablet  · guaiFENesin 600 MG extended release tablet  · levoFLOXacin 500 MG tablet  · potassium chloride 20 MEQ extended release tablet         Discharge Procedure Orders   Basic Metabolic Panel   Standing Status: Future Standing Exp. Date: 03/05/22   Order Comments: Send results to PCP also notify pending PTH result to PCP       Time Spent on discharge is  35 mins in patient examination, evaluation, counseling as well as medication reconciliation, prescriptions for required medications, discharge plan and follow up. Electronically signed by   Everette Morfin MD  2/5/2022  4:19 PM      Thank you Dr. Jovi Sierra MD for the opportunity to be involved in this patient's care.

## 2022-02-05 NOTE — CARE COORDINATION
Discharge Planning:    Writer faxed O2 rx, F2F, and testing to [facility] at 21 [phone]. O2 to be delivered to patient's bedside. Notified Xin with [facility] that patient will be discharging today.

## 2022-02-05 NOTE — PLAN OF CARE
Problem: Infection:  Goal: Will remain free from infection  Description: Will remain free from infection  Outcome: Ongoing     Problem: Safety:  Goal: Free from accidental physical injury  Description: Free from accidental physical injury  2/5/2022 0919 by Fran Salomon RN  Outcome: Ongoing  Note: Siderails up x 2  Hourly rounding  Call light in reach  Instructed to call for assist before attempting out of bed. Remains free from falls and accidental injury at this time   Floor free from obstacles  Bed is locked and in lowest position  Adequate lighting provided  Bed alarm on, [place] star and Stay with Me signs posted      2/4/2022 2154 by El Hall RN  Outcome: Ongoing  Note: Patient is free from injury this shift. Goal: Free from intentional harm  Description: Free from intentional harm  Outcome: Ongoing     Problem: Daily Care:  Goal: Daily care needs are met  Description: Daily care needs are met  2/5/2022 0919 by Fran Salomon RN  Outcome: Ongoing  2/4/2022 2154 by El Hall RN  Outcome: Ongoing  Note: Patient has all daily care met as are needed. Problem: Pain:  Goal: Patient's pain/discomfort is manageable  Description: Patient's pain/discomfort is manageable  Outcome: Ongoing  Note: Pain level assessment complete and patient reported no pain upon assessment.   No PRN pain medication given  Patient instructed to call out with new onset of pain or unrelieved pain       Problem: Skin Integrity:  Goal: Skin integrity will stabilize  Description: Skin integrity will stabilize  Outcome: Ongoing     Problem: Discharge Planning:  Goal: Patients continuum of care needs are met  Description: Patients continuum of care needs are met  Outcome: Ongoing     Problem: Airway Clearance - Ineffective:  Goal: Clear lung sounds  Description: Clear lung sounds  Outcome: Ongoing  Goal: Ability to maintain a clear airway will improve  Description: Ability to maintain a clear airway will improve  Outcome: Ongoing     Problem: Fluid Volume - Deficit:  Goal: Achieves intake and output within specified parameters  Description: Achieves intake and output within specified parameters  Outcome: Ongoing     Problem: Gas Exchange - Impaired:  Goal: Levels of oxygenation will improve  Description: Levels of oxygenation will improve  Outcome: Ongoing     Problem: Hyperthermia:  Goal: Ability to maintain a body temperature in the normal range will improve  Description: Ability to maintain a body temperature in the normal range will improve  Outcome: Ongoing     Problem: Tobacco Use:  Goal: Will participate in inpatient tobacco-use cessation counseling  Description: Will participate in inpatient tobacco-use cessation counseling  Outcome: Ongoing     Problem: Falls - Risk of:  Goal: Will remain free from falls  Description: Will remain free from falls  Outcome: Ongoing  Goal: Absence of physical injury  Description: Absence of physical injury  Outcome: Ongoing

## 2022-02-07 LAB
CULTURE: NORMAL
CULTURE: NORMAL
Lab: NORMAL
Lab: NORMAL
SPECIMEN DESCRIPTION: NORMAL
SPECIMEN DESCRIPTION: NORMAL

## 2022-03-04 PROBLEM — E86.0 DEHYDRATION: Status: RESOLVED | Noted: 2022-02-02 | Resolved: 2022-03-04
